# Patient Record
Sex: MALE | Race: WHITE | Employment: OTHER | URBAN - METROPOLITAN AREA
[De-identification: names, ages, dates, MRNs, and addresses within clinical notes are randomized per-mention and may not be internally consistent; named-entity substitution may affect disease eponyms.]

---

## 2021-08-14 ENCOUNTER — HOSPITAL ENCOUNTER (INPATIENT)
Age: 71
LOS: 8 days | Discharge: HOME | DRG: 177 | End: 2021-08-22
Attending: EMERGENCY MEDICINE | Admitting: HOSPITALIST
Payer: MEDICARE

## 2021-08-14 ENCOUNTER — APPOINTMENT (OUTPATIENT)
Dept: GENERAL RADIOLOGY | Age: 71
DRG: 177 | End: 2021-08-14
Attending: EMERGENCY MEDICINE
Payer: MEDICARE

## 2021-08-14 DIAGNOSIS — E11.65 UNCONTROLLED TYPE 2 DIABETES MELLITUS WITH HYPERGLYCEMIA (HCC): ICD-10-CM

## 2021-08-14 DIAGNOSIS — J12.82 PNEUMONIA DUE TO COVID-19 VIRUS: Primary | ICD-10-CM

## 2021-08-14 DIAGNOSIS — R09.02 HYPOXIA: ICD-10-CM

## 2021-08-14 DIAGNOSIS — U07.1 PNEUMONIA DUE TO COVID-19 VIRUS: Primary | ICD-10-CM

## 2021-08-14 LAB
ALBUMIN SERPL-MCNC: 2.5 G/DL (ref 3.5–5)
ALBUMIN/GLOB SERPL: 0.5 {RATIO} (ref 1.1–2.2)
ALP SERPL-CCNC: 87 U/L (ref 45–117)
ALT SERPL-CCNC: 26 U/L (ref 12–78)
ANION GAP SERPL CALC-SCNC: 7 MMOL/L (ref 5–15)
AST SERPL-CCNC: 45 U/L (ref 15–37)
ATRIAL RATE: 75 BPM
ATRIAL RATE: 80 BPM
BASOPHILS # BLD: 0 K/UL (ref 0–0.1)
BASOPHILS NFR BLD: 0 % (ref 0–1)
BILIRUB SERPL-MCNC: 0.8 MG/DL (ref 0.2–1)
BUN SERPL-MCNC: 32 MG/DL (ref 6–20)
BUN/CREAT SERPL: 24 (ref 12–20)
CALCIUM SERPL-MCNC: 9 MG/DL (ref 8.5–10.1)
CALCULATED P AXIS, ECG09: 46 DEGREES
CALCULATED P AXIS, ECG09: 57 DEGREES
CALCULATED R AXIS, ECG10: 19 DEGREES
CALCULATED R AXIS, ECG10: 31 DEGREES
CALCULATED T AXIS, ECG11: 38 DEGREES
CALCULATED T AXIS, ECG11: 47 DEGREES
CHLORIDE SERPL-SCNC: 104 MMOL/L (ref 97–108)
CO2 SERPL-SCNC: 26 MMOL/L (ref 21–32)
CREAT SERPL-MCNC: 1.31 MG/DL (ref 0.7–1.3)
CRP SERPL-MCNC: 16.1 MG/DL (ref 0–0.6)
D DIMER PPP FEU-MCNC: 1.41 MG/L FEU (ref 0–0.65)
DIAGNOSIS, 93000: NORMAL
DIAGNOSIS, 93000: NORMAL
DIFFERENTIAL METHOD BLD: ABNORMAL
EOSINOPHIL # BLD: 0 K/UL (ref 0–0.4)
EOSINOPHIL NFR BLD: 0 % (ref 0–7)
ERYTHROCYTE [DISTWIDTH] IN BLOOD BY AUTOMATED COUNT: 12.5 % (ref 11.5–14.5)
EST. AVERAGE GLUCOSE BLD GHB EST-MCNC: 260 MG/DL
GLOBULIN SER CALC-MCNC: 5.1 G/DL (ref 2–4)
GLUCOSE BLD STRIP.AUTO-MCNC: 160 MG/DL (ref 65–117)
GLUCOSE BLD STRIP.AUTO-MCNC: 213 MG/DL (ref 65–117)
GLUCOSE BLD STRIP.AUTO-MCNC: 306 MG/DL (ref 65–117)
GLUCOSE SERPL-MCNC: 182 MG/DL (ref 65–100)
HBA1C MFR BLD: 10.7 % (ref 4–5.6)
HCT VFR BLD AUTO: 48.7 % (ref 36.6–50.3)
HGB BLD-MCNC: 15.8 G/DL (ref 12.1–17)
IMM GRANULOCYTES # BLD AUTO: 0.1 K/UL (ref 0–0.04)
IMM GRANULOCYTES NFR BLD AUTO: 1 % (ref 0–0.5)
LACTATE SERPL-SCNC: 1.6 MMOL/L (ref 0.4–2)
LYMPHOCYTES # BLD: 1 K/UL (ref 0.8–3.5)
LYMPHOCYTES NFR BLD: 12 % (ref 12–49)
MAGNESIUM SERPL-MCNC: 2 MG/DL (ref 1.6–2.4)
MCH RBC QN AUTO: 30.2 PG (ref 26–34)
MCHC RBC AUTO-ENTMCNC: 32.4 G/DL (ref 30–36.5)
MCV RBC AUTO: 92.9 FL (ref 80–99)
MONOCYTES # BLD: 0.6 K/UL (ref 0–1)
MONOCYTES NFR BLD: 7 % (ref 5–13)
NEUTS SEG # BLD: 7.2 K/UL (ref 1.8–8)
NEUTS SEG NFR BLD: 80 % (ref 32–75)
NRBC # BLD: 0 K/UL (ref 0–0.01)
NRBC BLD-RTO: 0 PER 100 WBC
P-R INTERVAL, ECG05: 166 MS
P-R INTERVAL, ECG05: 172 MS
PLATELET # BLD AUTO: 255 K/UL (ref 150–400)
PMV BLD AUTO: 12 FL (ref 8.9–12.9)
POTASSIUM SERPL-SCNC: 4.3 MMOL/L (ref 3.5–5.1)
PROCALCITONIN SERPL-MCNC: 0.26 NG/ML
PROT SERPL-MCNC: 7.6 G/DL (ref 6.4–8.2)
Q-T INTERVAL, ECG07: 394 MS
Q-T INTERVAL, ECG07: 404 MS
QRS DURATION, ECG06: 104 MS
QRS DURATION, ECG06: 110 MS
QTC CALCULATION (BEZET), ECG08: 451 MS
QTC CALCULATION (BEZET), ECG08: 454 MS
RBC # BLD AUTO: 5.24 M/UL (ref 4.1–5.7)
SARS-COV-2, COV2: NORMAL
SERVICE CMNT-IMP: ABNORMAL
SODIUM SERPL-SCNC: 137 MMOL/L (ref 136–145)
VENTRICULAR RATE, ECG03: 75 BPM
VENTRICULAR RATE, ECG03: 80 BPM
WBC # BLD AUTO: 8.9 K/UL (ref 4.1–11.1)

## 2021-08-14 PROCEDURE — 74011636637 HC RX REV CODE- 636/637: Performed by: HOSPITALIST

## 2021-08-14 PROCEDURE — 85379 FIBRIN DEGRADATION QUANT: CPT

## 2021-08-14 PROCEDURE — 93005 ELECTROCARDIOGRAM TRACING: CPT

## 2021-08-14 PROCEDURE — 86140 C-REACTIVE PROTEIN: CPT

## 2021-08-14 PROCEDURE — 85025 COMPLETE CBC W/AUTO DIFF WBC: CPT

## 2021-08-14 PROCEDURE — 82962 GLUCOSE BLOOD TEST: CPT

## 2021-08-14 PROCEDURE — 36415 COLL VENOUS BLD VENIPUNCTURE: CPT

## 2021-08-14 PROCEDURE — 83735 ASSAY OF MAGNESIUM: CPT

## 2021-08-14 PROCEDURE — 83036 HEMOGLOBIN GLYCOSYLATED A1C: CPT

## 2021-08-14 PROCEDURE — 84145 PROCALCITONIN (PCT): CPT

## 2021-08-14 PROCEDURE — 74011250637 HC RX REV CODE- 250/637: Performed by: HOSPITALIST

## 2021-08-14 PROCEDURE — 83605 ASSAY OF LACTIC ACID: CPT

## 2021-08-14 PROCEDURE — 74011250636 HC RX REV CODE- 250/636: Performed by: HOSPITALIST

## 2021-08-14 PROCEDURE — U0005 INFEC AGEN DETEC AMPLI PROBE: HCPCS

## 2021-08-14 PROCEDURE — 80053 COMPREHEN METABOLIC PANEL: CPT

## 2021-08-14 PROCEDURE — 99285 EMERGENCY DEPT VISIT HI MDM: CPT

## 2021-08-14 PROCEDURE — 65660000000 HC RM CCU STEPDOWN

## 2021-08-14 PROCEDURE — 71045 X-RAY EXAM CHEST 1 VIEW: CPT

## 2021-08-14 RX ORDER — MELATONIN
2000 DAILY
Status: DISCONTINUED | OUTPATIENT
Start: 2021-08-14 | End: 2021-08-22 | Stop reason: HOSPADM

## 2021-08-14 RX ORDER — ZINC SULFATE 50(220)MG
1 CAPSULE ORAL DAILY
Status: DISCONTINUED | OUTPATIENT
Start: 2021-08-14 | End: 2021-08-22 | Stop reason: HOSPADM

## 2021-08-14 RX ORDER — GUAIFENESIN 100 MG/5ML
81 LIQUID (ML) ORAL DAILY
Status: DISCONTINUED | OUTPATIENT
Start: 2021-08-14 | End: 2021-08-22 | Stop reason: HOSPADM

## 2021-08-14 RX ORDER — ACETAMINOPHEN 650 MG/1
650 SUPPOSITORY RECTAL
Status: DISCONTINUED | OUTPATIENT
Start: 2021-08-14 | End: 2021-08-22 | Stop reason: HOSPADM

## 2021-08-14 RX ORDER — SODIUM CHLORIDE 0.9 % (FLUSH) 0.9 %
5-40 SYRINGE (ML) INJECTION EVERY 8 HOURS
Status: DISCONTINUED | OUTPATIENT
Start: 2021-08-14 | End: 2021-08-22 | Stop reason: HOSPADM

## 2021-08-14 RX ORDER — SODIUM CHLORIDE 0.9 % (FLUSH) 0.9 %
5-40 SYRINGE (ML) INJECTION AS NEEDED
Status: DISCONTINUED | OUTPATIENT
Start: 2021-08-14 | End: 2021-08-22 | Stop reason: HOSPADM

## 2021-08-14 RX ORDER — DEXAMETHASONE SODIUM PHOSPHATE 10 MG/ML
6 INJECTION INTRAMUSCULAR; INTRAVENOUS EVERY 24 HOURS
Status: DISCONTINUED | OUTPATIENT
Start: 2021-08-14 | End: 2021-08-22 | Stop reason: HOSPADM

## 2021-08-14 RX ORDER — ACETAMINOPHEN 325 MG/1
650 TABLET ORAL
Status: DISCONTINUED | OUTPATIENT
Start: 2021-08-14 | End: 2021-08-22 | Stop reason: HOSPADM

## 2021-08-14 RX ORDER — INSULIN LISPRO 100 [IU]/ML
INJECTION, SOLUTION INTRAVENOUS; SUBCUTANEOUS
Status: DISCONTINUED | OUTPATIENT
Start: 2021-08-14 | End: 2021-08-17

## 2021-08-14 RX ORDER — ASCORBIC ACID 500 MG
500 TABLET ORAL 2 TIMES DAILY
Status: DISCONTINUED | OUTPATIENT
Start: 2021-08-14 | End: 2021-08-22 | Stop reason: HOSPADM

## 2021-08-14 RX ORDER — MAGNESIUM SULFATE 100 %
4 CRYSTALS MISCELLANEOUS AS NEEDED
Status: DISCONTINUED | OUTPATIENT
Start: 2021-08-14 | End: 2021-08-22 | Stop reason: HOSPADM

## 2021-08-14 RX ORDER — ALBUTEROL SULFATE 90 UG/1
2 AEROSOL, METERED RESPIRATORY (INHALATION)
Status: DISCONTINUED | OUTPATIENT
Start: 2021-08-14 | End: 2021-08-15

## 2021-08-14 RX ORDER — LOSARTAN POTASSIUM 50 MG/1
50 TABLET ORAL DAILY
Status: DISCONTINUED | OUTPATIENT
Start: 2021-08-14 | End: 2021-08-22 | Stop reason: HOSPADM

## 2021-08-14 RX ORDER — ENOXAPARIN SODIUM 100 MG/ML
30 INJECTION SUBCUTANEOUS EVERY 12 HOURS
Status: DISCONTINUED | OUTPATIENT
Start: 2021-08-14 | End: 2021-08-22 | Stop reason: HOSPADM

## 2021-08-14 RX ORDER — ATORVASTATIN CALCIUM 20 MG/1
20 TABLET, FILM COATED ORAL DAILY
Status: DISCONTINUED | OUTPATIENT
Start: 2021-08-14 | End: 2021-08-22 | Stop reason: HOSPADM

## 2021-08-14 RX ORDER — DEXTROSE 50 % IN WATER (D50W) INTRAVENOUS SYRINGE
12.5-25 AS NEEDED
Status: DISCONTINUED | OUTPATIENT
Start: 2021-08-14 | End: 2021-08-22 | Stop reason: HOSPADM

## 2021-08-14 RX ADMIN — Medication 10 ML: at 15:05

## 2021-08-14 RX ADMIN — DEXAMETHASONE SODIUM PHOSPHATE 6 MG: 10 INJECTION INTRAMUSCULAR; INTRAVENOUS at 15:05

## 2021-08-14 RX ADMIN — ASPIRIN 81 MG: 81 TABLET, CHEWABLE ORAL at 15:05

## 2021-08-14 RX ADMIN — INSULIN LISPRO 4 UNITS: 100 INJECTION, SOLUTION INTRAVENOUS; SUBCUTANEOUS at 23:46

## 2021-08-14 RX ADMIN — INSULIN LISPRO 3 UNITS: 100 INJECTION, SOLUTION INTRAVENOUS; SUBCUTANEOUS at 18:14

## 2021-08-14 RX ADMIN — ZINC SULFATE 220 MG (50 MG) CAPSULE 1 CAPSULE: CAPSULE at 08:17

## 2021-08-14 RX ADMIN — OXYCODONE HYDROCHLORIDE AND ACETAMINOPHEN 500 MG: 500 TABLET ORAL at 08:17

## 2021-08-14 RX ADMIN — ATORVASTATIN CALCIUM 20 MG: 20 TABLET, FILM COATED ORAL at 15:05

## 2021-08-14 RX ADMIN — LOSARTAN POTASSIUM 50 MG: 50 TABLET, FILM COATED ORAL at 15:05

## 2021-08-14 RX ADMIN — OXYCODONE HYDROCHLORIDE AND ACETAMINOPHEN 500 MG: 500 TABLET ORAL at 18:14

## 2021-08-14 RX ADMIN — Medication 2000 UNITS: at 08:17

## 2021-08-14 RX ADMIN — ENOXAPARIN SODIUM 30 MG: 40 INJECTION SUBCUTANEOUS at 18:14

## 2021-08-14 RX ADMIN — Medication 10 ML: at 23:46

## 2021-08-14 NOTE — PROGRESS NOTES
TRANSFER - IN REPORT:    Verbal report received from [address] [place] [address] (name) on Bella Maldonado  being received from ED (unit) for routine progression of care      Report consisted of patients Situation, Background, Assessment and   Recommendations(SBAR). Information from the following report(s) SBAR, Kardex, [facility] and Recent Results was reviewed with the receiving nurse. Opportunity for questions and clarification was provided. Assessment completed upon patients arrival to unit and care assumed.

## 2021-08-14 NOTE — ED TRIAGE NOTES
Pt arrives with CC of low oxygen due to COVID 19. Pt oxygen saturation is 86% on RA. He was diagnosed Thursday with COVID. He denies COVID vaccine.

## 2021-08-14 NOTE — ED NOTES
TRANSFER - OUT REPORT:    Verbal report given to CONRADO Spence on [employer]  being transferred to  for routine progression of care       Report consisted of patients Situation, Background, Assessment and   Recommendations(SBAR). Information from the following report(s) ED Summary was reviewed with the receiving nurse. Lines:   Peripheral IV 08/14/21 Right Antecubital (Active)   Site Assessment Clean, dry, & intact 08/14/21 0623   Phlebitis Assessment 0 08/14/21 0623   Infiltration Assessment 0 08/14/21 0623   Dressing Status Clean, dry, & intact 08/14/21 0623   Hub Color/Line Status Pink 08/14/21 0623   Action Taken Blood drawn 08/14/21 2220        Opportunity for questions and clarification was provided.       Patient transported with:   O2 @ 4 liters

## 2021-08-14 NOTE — H&P
[address] [place] [address]  HISTORY AND PHYSICAL    Name:  Viki Pruitt  MR#:  [id number]  :  1950  ACCOUNT #:  [de-identified]  ADMIT DATE:  2021      PRIMARY CARE PROVIDER:   Shamar Cooper MD    SOURCE OF INFORMATION:  Patient. CHIEF COMPLAINT:  Low oxygen and fever, last 1-2 days. HISTORY OF PRESENTING ILLNESS:  This is a 60-year-old  male with past medical history significant for type 2 diabetes mellitus, hypertension, and hypercholesterolemia, who presented to [facility] emergency department with low oxygen 84% at home and fever, 1-2 days. He stated that he was diagnosed with COVID-19 a week ago after he had had fever and dry cough. For the last 2 days he has become weak, spike fever and his oxygen level at home was 84%, and decided to come to [facility] emergency department. No left-sided chest pain, palpitation, diaphoresis, nausea, vomiting, abdominal pain, urinary complaint, abnormal bowel movement, or lower extremities swelling. He has short of breath worse with exertion. He has not vaccinated for COVID-19. When he was seen in ER, his vital signs; blood pressure 112/66, pulse 85, temperature 98, saturation of oxygen 86% on room air, and oxygen improved when he was placed on 4 liters per minute. Chest x-ray was done showed bibasilar patchy infiltrate, and hospitalist service is consulted for further evaluation and admission. REVIEW OF SYSTEMS:  Pertinent positive findings mentioned in HPI. All systems reviewed, no any other positive findings. PAST MEDICAL HISTORY:  1. Type 2 diabetes mellitus. 2.  Hypertension. 3.  Hypercholesterolemia. PRIOR TO ADMISSION MEDICATIONS:  Include:  1. Metformin 500 mg p.o. b.i.d.  2.  Glimepiride 4 mg p.o. daily. 3.  Statin. 4.  Aspirin 81 mg p.o. daily. 5.  Losartan 50 mg p.o. daily. ALLERGIES:  SHELLFISH     SOCIAL HISTORY:  He lives with his wife. No tobacco or alcohol abuse. Ambulate independently.   Code status, full code. FAMILY HISTORY:  Father:  History of diabetes and heart disease. Mother:  History of cancer but he did not remember. PHYSICAL EXAMINATION:  VITAL SIGNS:  Blood pressure 132/73, pulse 74, temperature 98, saturation of oxygen 92% on 4 liters per minute nasal cannula. GENERAL APPEARANCE:  The patient is alert, cooperative, not in distress. He appears his stated age. HEENT:  Pink conjunctivae. Anicteric sclerae. Moist tongue and buccal mucosa. LUNGS:  Decreased bronchial breath sounds to auscultation bilaterally. CHEST WALL:  No tenderness or deformity. CARDIOVASCULAR SYSTEM:  Regular rate and rhythm. S1 and S2 normal.  No murmur or gallop. ABDOMEN:  Soft, nontender. Bowel sounds normal.  No mass or organomegaly. EXTREMITIES:  No cyanosis or edema. SKIN:  No rash or lesion. CENTRAL NERVOUS SYSTEM:  Conscious, well oriented to time, place and person. Motor 5/5. Sensation intact. Cranial nerves II-XII grossly intact. DIAGNOSTIC DATA:  Normal sinus rhythm, ventricular rate 80 beats per minute, nonspecific ST-T wave. LABORATORY DATA:  Hematology:  White blood cell count 8.9, hemoglobin 15.8, hematocrit 48.7, MCV 92.9, platelet count 687. D-dimer 1.41. Chemistry:  Sodium 137, potassium 4.3, chloride 104, CO2 of 26, anion gap 7, glucose 182, BUN 32, creatinine 1.31, BUN-creatinine ratio 24, calcium 9, magnesium 2. Total bilirubin 0.8, total protein 7.6, albumin 5.1, ALT 26, AST 48, alkaline phosphatase 87. Lactic acid 1.6. Procalcitonin 0.26.  C-reactive protein 16.10. Chest x-ray, bibasilar the patchy infiltrates. ASSESSMENT:  1. Acute hypoxic respiratory failure due to COVID pneumonia. 2.  Type 2 diabetes mellitus with hyperglycemia. 3.  Hypertension. 4.  Acute renal insufficiency. 5.  History of hypercholesterolemia. PLAN:  1. Acute hypoxic respiratory failure secondary to COVID-19 pneumonia:  Admit the patient to telemetry floor.   The patient tested for COVID a week ago. will do PCR, droplet plus precaution. start  Decadron 6 mg IV q.24 hours, vitamin C 500 mg p.o. b.i.d., zinc 220 mg p.o. daily, vitamin D3 2000 units p.o. daily, and p.r.n. albuterol inhaler, check inflammatory marker,Lovenox 30 mg subcutaneous b.i.d. The patient is not a candidate for remdesivir because of renal insufficiency and duration of illness. Monitor pulse ox  2. Type 2 diabetes mellitus with hyperglycemia:   check A1c. Hold home glimepiride and metformin. will do insulin sliding scale, monitor fingerstick glucose. 3.  History of hypertension:  Blood pressure normal.  Continue home medication losartan. 4.  History of hypercholesteremia:  Continue home medication statin. DVT prophylaxis:  Lovenox. FUNCTIONAL STATUS PRIOR TO ADMISSION:  The patient ambulates independently.         Cierra Pang MD      [id number]  D:  08/14/2021 8:03  T:  08/14/2021 10:24  JOB #:  [id number]

## 2021-08-14 NOTE — ACP (ADVANCE CARE PLANNING)
Advance Care Planning (ACP) Provider Note - Comprehensive     Date of ACP Conversation: 08/14/21     Persons included in Conversation: Patient and Dr Maci Belle    Patient Active Problem List   Diagnosis Code    Pneumonia due to COVID-19 virus U07.1, J12.82     These active diagnoses are of sufficient risk that focused discussion on advance care planning is indicated in order to allow the patient to thoughtfully consider him personal goals of care and, if situations arise that prevent the ability to personally give input, to ensure appropriate representation of her; personal desires through documentation or informed surrogate decision makers. Authorized Decision Maker (if patient is incapable of making informed decisions): This person is:  Wife, Christen Craigcharlotte    Discussions : I reviewed his acute medical condition including; Acute hypoxic respiratory failure due to COVID 19 pneumonia, T2DM with hyperglycemia, HTN and discussed his desire for ongoing aggressive care, including potential intubation and mechanical ventilation; also discussed who would speak on his behalf should he be unable to do so and discussed what conversation he has had with his family so they understand his desire if such a situation occurred now or in the future . He said he wants aggressive care during this admission and wants a Full Code. He said his wife Christen Yang is his medical decision maker.              Length of ACP Conversation in minutes:  16 minutes         Trevin Fuentes MD  8/14/2021

## 2021-08-14 NOTE — ED PROVIDER NOTES
History of hypertension, diabetes. He presents with complaints of being Covid positive with O2 saturations of 84% at home. He states that he was diagnosed with Covid 1 week ago. He began to feel ill 1 to 2 days prior to being diagnosed. He has had fevers. He has had a mild cough. He feels weak and fatigued. His appetite has been decreased. He has been able to drink fluids. He has not had the Covid vaccine. No past medical history on file. No past surgical history on file. No family history on file. Social History     Socioeconomic History    Marital status:      Spouse name: Not on file    Number of children: Not on file    Years of education: Not on file    Highest education level: Not on file   Occupational History    Not on file   Tobacco Use    Smoking status: Not on file   Substance and Sexual Activity    Alcohol use: Not on file    Drug use: Not on file    Sexual activity: Not on file   Other Topics Concern    Not on file   Social History Narrative    Not on file     Social Determinants of Health     Financial Resource Strain:     Difficulty of Paying Living Expenses:    Food Insecurity:     Worried About Running Out of Food in the Last Year:     [address] in the Last Year:    Transportation Needs:     Lack of Transportation (Medical):  Lack of Transportation (Non-Medical):    Physical Activity:     Days of Exercise per Week:     Minutes of Exercise per Session:    Stress:     Feeling of Stress :    Social Connections:     Frequency of Communication with Friends and Family:     Frequency of Social Gatherings with Friends and Family:     Attends Islam Services:     Active Member of Clubs or Organizations:     Attends Club or Organization Meetings:     Marital Status:    Intimate Partner Violence:     Fear of Current or Ex-Partner:     Emotionally Abused:     Physically Abused:     Sexually Abused:           ALLERGIES: Shellfish derived    Review of Systems   All other systems reviewed and are negative. Vitals:    08/14/21 0530 08/14/21 0540 08/14/21 0600   BP: 112/66  137/76   Pulse: 85  87   Resp: 18  24   Temp: 98 °F (36.7 °C)     SpO2: (!) 86% 92% 91%            Physical Exam  Vitals and nursing note reviewed. Constitutional:       Appearance: He is well-developed. Comments: Moderately ill-appearing. HENT:      Head: Normocephalic and atraumatic. Eyes:      Conjunctiva/sclera: Conjunctivae normal.   Neck:      Trachea: No tracheal deviation. Cardiovascular:      Rate and Rhythm: Normal rate and regular rhythm. Heart sounds: Normal heart sounds. No murmur heard. No friction rub. No gallop. Pulmonary:      Effort: Pulmonary effort is normal.      Comments: Decreased breath sounds  Abdominal:      Palpations: Abdomen is soft. Tenderness: There is no abdominal tenderness. Musculoskeletal:         General: No deformity. Cervical back: Neck supple. Skin:     General: Skin is warm and dry. Neurological:      Mental Status: He is alert. Comments: oriented          MDM       Procedures    Perfect Serve Consult for Admission  6:44 AM    ED Room Number: ER10/10  [facility] [address] 70 [facility]  male  Working Diagnosis: Pneumonia/Covid/hypoxia  COVID-19 Suspicion:  yes  Sepsis present:  no  Reassessment needed: no  Code Status:  Full Code  Readmission: no  Isolation Requirements:  yes  Recommended Level of Care:  telemetry  Department:[facility]   Other: Covid positive. Day 8 or 9. Pulse ox 84% at home. 86% here on arrival.  On 4 L with sats in the low 90s. He does not look in any respiratory distress. Chest x-ray shows bilateral infiltrates. EKG: Normal sinus rhythm; rate of 80; anterior Q waves; normal ST, Mervat Doss MD  6:56 AM    Consult note: Dr. Rizwan Jaquez -will arrange admission.   Susannah Gunn MD  6:56 AM    Total critical care time spent exclusive of procedures: 35 minutes.   Larry Fox MD  6:58 AM

## 2021-08-15 ENCOUNTER — APPOINTMENT (OUTPATIENT)
Dept: CT IMAGING | Age: 71
DRG: 177 | End: 2021-08-15
Attending: INTERNAL MEDICINE
Payer: MEDICARE

## 2021-08-15 LAB
ALBUMIN SERPL-MCNC: 2.2 G/DL (ref 3.5–5)
ALBUMIN/GLOB SERPL: 0.4 {RATIO} (ref 1.1–2.2)
ALP SERPL-CCNC: 89 U/L (ref 45–117)
ALT SERPL-CCNC: 29 U/L (ref 12–78)
ANION GAP SERPL CALC-SCNC: 6 MMOL/L (ref 5–15)
ANION GAP SERPL CALC-SCNC: 9 MMOL/L (ref 5–15)
AST SERPL-CCNC: 35 U/L (ref 15–37)
BASOPHILS # BLD: 0 K/UL (ref 0–0.1)
BASOPHILS NFR BLD: 0 % (ref 0–1)
BILIRUB SERPL-MCNC: 0.6 MG/DL (ref 0.2–1)
BUN SERPL-MCNC: 33 MG/DL (ref 6–20)
BUN SERPL-MCNC: 33 MG/DL (ref 6–20)
BUN/CREAT SERPL: 31 (ref 12–20)
BUN/CREAT SERPL: 34 (ref 12–20)
CALCIUM SERPL-MCNC: 9 MG/DL (ref 8.5–10.1)
CALCIUM SERPL-MCNC: 9.3 MG/DL (ref 8.5–10.1)
CHLORIDE SERPL-SCNC: 106 MMOL/L (ref 97–108)
CHLORIDE SERPL-SCNC: 107 MMOL/L (ref 97–108)
CO2 SERPL-SCNC: 24 MMOL/L (ref 21–32)
CO2 SERPL-SCNC: 26 MMOL/L (ref 21–32)
COVID-19 RAPID TEST, COVR: DETECTED
CREAT SERPL-MCNC: 0.96 MG/DL (ref 0.7–1.3)
CREAT SERPL-MCNC: 1.08 MG/DL (ref 0.7–1.3)
CRP SERPL-MCNC: 12 MG/DL (ref 0–0.6)
D DIMER PPP FEU-MCNC: 1.42 MG/L FEU (ref 0–0.65)
DIFFERENTIAL METHOD BLD: ABNORMAL
EOSINOPHIL # BLD: 0 K/UL (ref 0–0.4)
EOSINOPHIL NFR BLD: 0 % (ref 0–7)
ERYTHROCYTE [DISTWIDTH] IN BLOOD BY AUTOMATED COUNT: 12.1 % (ref 11.5–14.5)
FERRITIN SERPL-MCNC: 1723 NG/ML (ref 26–388)
FIBRINOGEN PPP-MCNC: >800 MG/DL (ref 200–475)
GLOBULIN SER CALC-MCNC: 5.2 G/DL (ref 2–4)
GLUCOSE BLD STRIP.AUTO-MCNC: 266 MG/DL (ref 65–117)
GLUCOSE BLD STRIP.AUTO-MCNC: 271 MG/DL (ref 65–117)
GLUCOSE BLD STRIP.AUTO-MCNC: 330 MG/DL (ref 65–117)
GLUCOSE BLD STRIP.AUTO-MCNC: 442 MG/DL (ref 65–117)
GLUCOSE SERPL-MCNC: 308 MG/DL (ref 65–100)
GLUCOSE SERPL-MCNC: 312 MG/DL (ref 65–100)
HCT VFR BLD AUTO: 49.4 % (ref 36.6–50.3)
HGB BLD-MCNC: 15.8 G/DL (ref 12.1–17)
IMM GRANULOCYTES # BLD AUTO: 0 K/UL
IMM GRANULOCYTES NFR BLD AUTO: 0 %
INR PPP: 1.1 (ref 0.9–1.1)
LYMPHOCYTES # BLD: 0.7 K/UL (ref 0.8–3.5)
LYMPHOCYTES NFR BLD: 10 % (ref 12–49)
MCH RBC QN AUTO: 29.8 PG (ref 26–34)
MCHC RBC AUTO-ENTMCNC: 32 G/DL (ref 30–36.5)
MCV RBC AUTO: 93 FL (ref 80–99)
MONOCYTES # BLD: 0.5 K/UL (ref 0–1)
MONOCYTES NFR BLD: 8 % (ref 5–13)
NEUTS SEG # BLD: 5.3 K/UL (ref 1.8–8)
NEUTS SEG NFR BLD: 82 % (ref 32–75)
NRBC # BLD: 0 K/UL (ref 0–0.01)
NRBC BLD-RTO: 0 PER 100 WBC
PLATELET # BLD AUTO: 283 K/UL (ref 150–400)
PMV BLD AUTO: 12.1 FL (ref 8.9–12.9)
POTASSIUM SERPL-SCNC: 4.4 MMOL/L (ref 3.5–5.1)
POTASSIUM SERPL-SCNC: 4.8 MMOL/L (ref 3.5–5.1)
PROT SERPL-MCNC: 7.4 G/DL (ref 6.4–8.2)
PROTHROMBIN TIME: 11 SEC (ref 9–11.1)
RBC # BLD AUTO: 5.31 M/UL (ref 4.1–5.7)
RBC MORPH BLD: ABNORMAL
SARS-COV-2, XPLCVT: DETECTED
SERVICE CMNT-IMP: ABNORMAL
SODIUM SERPL-SCNC: 137 MMOL/L (ref 136–145)
SODIUM SERPL-SCNC: 141 MMOL/L (ref 136–145)
SOURCE, COVRS: ABNORMAL
SOURCE, COVRS: ABNORMAL
TROPONIN I SERPL-MCNC: <0.05 NG/ML
WBC # BLD AUTO: 6.5 K/UL (ref 4.1–11.1)

## 2021-08-15 PROCEDURE — 86140 C-REACTIVE PROTEIN: CPT

## 2021-08-15 PROCEDURE — 84484 ASSAY OF TROPONIN QUANT: CPT

## 2021-08-15 PROCEDURE — 94640 AIRWAY INHALATION TREATMENT: CPT

## 2021-08-15 PROCEDURE — 85384 FIBRINOGEN ACTIVITY: CPT

## 2021-08-15 PROCEDURE — 82962 GLUCOSE BLOOD TEST: CPT

## 2021-08-15 PROCEDURE — 74011250636 HC RX REV CODE- 250/636: Performed by: HOSPITALIST

## 2021-08-15 PROCEDURE — 65660000000 HC RM CCU STEPDOWN

## 2021-08-15 PROCEDURE — 77010033711 HC HIGH FLOW OXYGEN

## 2021-08-15 PROCEDURE — 74011000636 HC RX REV CODE- 636: Performed by: RADIOLOGY

## 2021-08-15 PROCEDURE — 74011636637 HC RX REV CODE- 636/637: Performed by: HOSPITALIST

## 2021-08-15 PROCEDURE — 85610 PROTHROMBIN TIME: CPT

## 2021-08-15 PROCEDURE — 74011250637 HC RX REV CODE- 250/637: Performed by: HOSPITALIST

## 2021-08-15 PROCEDURE — 74011636637 HC RX REV CODE- 636/637: Performed by: NURSE PRACTITIONER

## 2021-08-15 PROCEDURE — 85025 COMPLETE CBC W/AUTO DIFF WBC: CPT

## 2021-08-15 PROCEDURE — 87635 SARS-COV-2 COVID-19 AMP PRB: CPT

## 2021-08-15 PROCEDURE — 74011000258 HC RX REV CODE- 258: Performed by: NURSE PRACTITIONER

## 2021-08-15 PROCEDURE — 74011636637 HC RX REV CODE- 636/637: Performed by: INTERNAL MEDICINE

## 2021-08-15 PROCEDURE — 80053 COMPREHEN METABOLIC PANEL: CPT

## 2021-08-15 PROCEDURE — 74011250637 HC RX REV CODE- 250/637: Performed by: NURSE PRACTITIONER

## 2021-08-15 PROCEDURE — 71275 CT ANGIOGRAPHY CHEST: CPT

## 2021-08-15 PROCEDURE — 85379 FIBRIN DEGRADATION QUANT: CPT

## 2021-08-15 PROCEDURE — 36415 COLL VENOUS BLD VENIPUNCTURE: CPT

## 2021-08-15 PROCEDURE — XW033H5 INTRODUCTION OF TOCILIZUMAB INTO PERIPHERAL VEIN, PERCUTANEOUS APPROACH, NEW TECHNOLOGY GROUP 5: ICD-10-PCS | Performed by: INTERNAL MEDICINE

## 2021-08-15 PROCEDURE — 77010033678 HC OXYGEN DAILY

## 2021-08-15 PROCEDURE — 82728 ASSAY OF FERRITIN: CPT

## 2021-08-15 RX ORDER — FUROSEMIDE 10 MG/ML
40 INJECTION INTRAMUSCULAR; INTRAVENOUS ONCE
Status: ACTIVE | OUTPATIENT
Start: 2021-08-15 | End: 2021-08-15

## 2021-08-15 RX ORDER — ALBUTEROL SULFATE 90 UG/1
2 AEROSOL, METERED RESPIRATORY (INHALATION)
Status: DISCONTINUED | OUTPATIENT
Start: 2021-08-15 | End: 2021-08-17

## 2021-08-15 RX ORDER — INSULIN GLARGINE 100 [IU]/ML
20 INJECTION, SOLUTION SUBCUTANEOUS DAILY
Status: DISCONTINUED | OUTPATIENT
Start: 2021-08-15 | End: 2021-08-16

## 2021-08-15 RX ADMIN — Medication 10 ML: at 13:17

## 2021-08-15 RX ADMIN — INSULIN LISPRO 6 UNITS: 100 INJECTION, SOLUTION INTRAVENOUS; SUBCUTANEOUS at 23:46

## 2021-08-15 RX ADMIN — ENOXAPARIN SODIUM 30 MG: 40 INJECTION SUBCUTANEOUS at 18:18

## 2021-08-15 RX ADMIN — LOSARTAN POTASSIUM 50 MG: 50 TABLET, FILM COATED ORAL at 10:49

## 2021-08-15 RX ADMIN — IOPAMIDOL 100 ML: 755 INJECTION, SOLUTION INTRAVENOUS at 18:00

## 2021-08-15 RX ADMIN — Medication 10 ML: at 23:11

## 2021-08-15 RX ADMIN — INSULIN GLARGINE 20 UNITS: 100 INJECTION, SOLUTION SUBCUTANEOUS at 08:39

## 2021-08-15 RX ADMIN — OXYCODONE HYDROCHLORIDE AND ACETAMINOPHEN 500 MG: 500 TABLET ORAL at 18:19

## 2021-08-15 RX ADMIN — ZINC SULFATE 220 MG (50 MG) CAPSULE 1 CAPSULE: CAPSULE at 10:48

## 2021-08-15 RX ADMIN — INSULIN LISPRO 5 UNITS: 100 INJECTION, SOLUTION INTRAVENOUS; SUBCUTANEOUS at 08:40

## 2021-08-15 RX ADMIN — Medication 2000 UNITS: at 10:49

## 2021-08-15 RX ADMIN — ATORVASTATIN CALCIUM 20 MG: 20 TABLET, FILM COATED ORAL at 10:49

## 2021-08-15 RX ADMIN — ENOXAPARIN SODIUM 30 MG: 40 INJECTION SUBCUTANEOUS at 07:57

## 2021-08-15 RX ADMIN — Medication 10 ML: at 07:57

## 2021-08-15 RX ADMIN — ALBUTEROL SULFATE 2 PUFF: 90 AEROSOL, METERED RESPIRATORY (INHALATION) at 14:28

## 2021-08-15 RX ADMIN — DEXAMETHASONE SODIUM PHOSPHATE 6 MG: 10 INJECTION INTRAMUSCULAR; INTRAVENOUS at 13:25

## 2021-08-15 RX ADMIN — INSULIN LISPRO 7 UNITS: 100 INJECTION, SOLUTION INTRAVENOUS; SUBCUTANEOUS at 18:19

## 2021-08-15 RX ADMIN — TOCILIZUMAB 810 MG: 180 INJECTION, SOLUTION SUBCUTANEOUS at 13:11

## 2021-08-15 RX ADMIN — INSULIN LISPRO 5 UNITS: 100 INJECTION, SOLUTION INTRAVENOUS; SUBCUTANEOUS at 13:25

## 2021-08-15 RX ADMIN — OXYCODONE HYDROCHLORIDE AND ACETAMINOPHEN 500 MG: 500 TABLET ORAL at 10:49

## 2021-08-15 RX ADMIN — ALBUTEROL SULFATE 2 PUFF: 90 AEROSOL, METERED RESPIRATORY (INHALATION) at 20:15

## 2021-08-15 RX ADMIN — ASPIRIN 81 MG: 81 TABLET, CHEWABLE ORAL at 10:49

## 2021-08-15 NOTE — PROGRESS NOTES
[facility]  Hospitalist Group                                                                                          Hospitalist Progress Note  Marianne Tripp MD  Answering service: [phone] from in house phone        Date of Service:  8/15/2021  NAME:  Hector Yao  :  1950  MRN:  [id number]      Admission Summary: This is a 51-year-old  male with past medical history significant for type 2 diabetes mellitus, hypertension, and hypercholesterolemia, who presented to [facility] emergency department with low oxygen 84% at home and fever, 1-2 days. Interval history / Subjective: Thinks his breathing is improved actually since admission, however oxygen requirements have increased dramatically. Now maxxed out on mid flow. Long discussion with patient regarding intubation and code status. He would like to be DNR, DDNR signed. Assessment & Plan:     COVID 19  Acute hypoxic respiratory failure  - O2 to maintain saturations over 90%, currently maxed out on mid flow. - Start high flow nasal cannula and titrate up   - Continue dexamethasone, VitC, Zinc, VitD  - Out of the window for Remdesivir per hospital policy   - Encouraged prone positioning, incentive spirometry   - Procalcitonin is 0.26 hold antibiotics for now   - Monitor inflammatory markers   - AC prophylaxis per protocol   - Pulmonary consult for Actemra     Type 2 diabetes - A1c 10.7%  - SSI, POCT glucose checks and hypoglycemia protocol   - Start basal insulin, 20U, and uptitrate     HTN - Continue losartan and monitor BP   HLD - statin     Code status: DNR, DDNR signed   DVT prophylaxis: lovenox     CRITICAL CARE ATTESTATION:  I had a face to face encounter with the patient, reviewed and interpreted patient data including clinical events, labs, images, vital signs, I/O's, and examined patient.   I have discussed the case and the plan and management of the patient's care with the consulting services, the bedside nurses and necessary ancillary providers. NOTE OF PERSONAL INVOLVEMENT IN CARE   This patient has a high probability of imminent, clinically significant deterioration, which requires the highest level of preparedness to intervene urgently. I participated in the decision-making and personally managed or directed the management of the following life and organ supporting interventions that required my frequent assessment to treat or prevent imminent deterioration. I personally spent 45 minutes of critical care time. This is time spent at this critically ill patient's bedside actively involved in patient care as well as the coordination of care and discussions with the patient's family. This does not include any procedural time which has been billed separately. Care Plan discussed with: Patient/Family  Anticipated Disposition: Home w/Family  Anticipated Discharge: Greater than 48 hours     Hospital Problems  Never Reviewed        Codes Class Noted POA    Pneumonia due to COVID-19 virus ICD-10-CM: U07.1, J12.82  ICD-9-CM: 480.8, 079.89  8/14/2021 Unknown                Review of Systems:   A comprehensive review of systems was negative except for that written in the HPI. Vital Signs:    Last 24hrs VS reviewed since prior progress note. Most recent are:  Visit Vitals  BP (!) 159/79 (BP 1 Location: Right arm, BP Patient Position: At rest)   Pulse 65   Temp 97.2 °F (36.2 °C)   Resp 16   Ht 6' (1.829 m)   Wt 94.8 kg (209 lb)   SpO2 91%   BMI 28.35 kg/m²       No intake or output data in the 24 hours ending 08/15/21 1334     Physical Examination:     I had a face to face encounter with this patient and independently examined them on 8/15/2021 as outlined below:          Constitutional:  Mild resp distress, cooperative, pleasant    ENT:  Oral mucosa moist, oropharynx benign. Resp:  Crackles bilaterally. No wheezing/rhonchi/rales. Tachypnea.  Hypoxic with low 90's on 15L CV:  Regular rhythm, normal rate, no murmurs, gallops, rubs    GI:  Soft, non distended, non tender. normoactive bowel sounds, no hepatosplenomegaly     Musculoskeletal:  No edema, warm, 2+ pulses throughout    Neurologic:  Moves all extremities. AAOx3, CN II-XII reviewed            Data Review:    Review and/or order of clinical lab test  Review and/or order of tests in the radiology section of CPT  Review and/or order of tests in the medicine section of [facility]      Labs:     Recent Labs     08/15/21  0743 08/14/21  0558   WBC 6.5 8.9   HGB 15.8 15.8   HCT 49.4 48.7    255     Recent Labs     08/15/21  0743 08/14/21  0558     137 137   K 4.8  4.4 4.3     107 104   CO2 26  24 26   BUN 33*  33* 32*   CREA 0.96  1.08 1.31*   *  312* 182*   CA 9.0  9.3 9.0   MG  --  2.0     Recent Labs     08/15/21  0743 08/14/21  0558   ALT 29 26   AP 89 87   TBILI 0.6 0.8   TP 7.4 7.6   ALB 2.2* 2.5*   GLOB 5.2* 5.1*     Recent Labs     08/15/21  0743   INR 1.1   PTP 11.0      Recent Labs     08/15/21  0743   FERR 1,723*      No results found for: FOL, RBCF   No results for input(s): PH, PCO2, PO2 in the last 72 hours.   Recent Labs     08/15/21  0743   TROIQ <0.05     No results found for: CHOL, CHOLX, CHLST, CHOLV, HDL, HDLP, LDL, LDLC, DLDLP, TGLX, TRIGL, TRIGP, CHHD, CHHDX  Lab Results   Component Value Date/Time    Glucose (POC) 271 (H) 08/15/2021 01:10 PM    Glucose (POC) 266 (H) 08/15/2021 07:56 AM    Glucose (POC) 306 (H) 08/14/2021 09:37 PM    Glucose (POC) 213 (H) 08/14/2021 05:11 PM    Glucose (POC) 160 (H) 08/14/2021 12:49 PM     No results found for: COLOR, APPRN, SPGRU, REFSG, KYLER, PROTU, GLUCU, KETU, BILU, UROU, AQUILES, LEUKU, GLUKE, EPSU, BACTU, WBCU, RBCU, CASTS, UCRY      Medications Reviewed:     Current Facility-Administered Medications   Medication Dose Route Frequency    insulin glargine (LANTUS) injection 20 Units  20 Units SubCUTAneous DAILY    albuterol (PROVENTIL HFA, VENTOLIN HFA, PROAIR HFA) inhaler 2 Puff  2 Puff Inhalation Q4H RT    [Held by provider] furosemide (LASIX) injection 40 mg  40 mg IntraVENous ONCE    tocilizumab (ACTEMRA ACTPEN) 810 mg in 0.9% sodium chloride 100 mL, overfill volume 10 mL infusion  810 mg IntraVENous ONCE    enoxaparin (LOVENOX) injection 30 mg  30 mg SubCUTAneous Q12H    acetaminophen (TYLENOL) tablet 650 mg  650 mg Oral Q6H PRN    Or    acetaminophen (TYLENOL) suppository 650 mg  650 mg Rectal Q6H PRN    dexamethasone (PF) (DECADRON) 10 mg/mL injection 6 mg  6 mg IntraVENous Q24H    cholecalciferol (VITAMIN D3) (1000 Units /25 mcg) tablet 2,000 Units  2,000 Units Oral DAILY    insulin lispro (HUMALOG) injection   SubCUTAneous AC&HS    glucose chewable tablet 16 g  4 Tablet Oral PRN    dextrose (D50W) injection syrg 12.5-25 g  12.5-25 g IntraVENous PRN    glucagon (GLUCAGEN) injection 1 mg  1 mg IntraMUSCular PRN    ascorbic acid (vitamin C) (VITAMIN C) tablet 500 mg  500 mg Oral BID    zinc sulfate (ZINCATE) 50 mg zinc (220 mg) capsule 1 Capsule  1 Capsule Oral DAILY    sodium chloride (NS) flush 5-40 mL  5-40 mL IntraVENous Q8H    sodium chloride (NS) flush 5-40 mL  5-40 mL IntraVENous PRN    aspirin chewable tablet 81 mg  81 mg Oral DAILY    losartan (COZAAR) tablet 50 mg  50 mg Oral DAILY    atorvastatin (LIPITOR) tablet 20 mg  20 mg Oral DAILY     ______________________________________________________________________  EXPECTED LENGTH OF STAY: - - -  ACTUAL LENGTH OF STAY:          1                 Walt Wyatt MD

## 2021-08-15 NOTE — PROGRESS NOTES
Bedside shift change report given to [address] (oncoming nurse) by [facility] RN  (offgoing nurse). Report included the following information SBAR, Kardex, MAR and Recent Results.

## 2021-08-15 NOTE — PROGRESS NOTES
Patient to be transferred to higher level of care this evening for high flow O2. Problem: Risk for Spread of Infection  Goal: Prevent transmission of infectious organism to others  Description: Prevent the transmission of infectious organisms to other patients, staff members, and visitors. Outcome: Progressing Towards Goal     Problem: Patient Education:  Go to Education Activity  Goal: Patient/Family Education  Outcome: Progressing Towards Goal     Problem: Diabetes Self-Management  Goal: *Disease process and treatment process  Description: Define diabetes and identify own type of diabetes; list 3 options for treating diabetes. Outcome: Progressing Towards Goal  Goal: *Incorporating nutritional management into lifestyle  Description: Describe effect of type, amount and timing of food on blood glucose; list 3 methods for planning meals. Outcome: Progressing Towards Goal  Goal: *Incorporating physical activity into lifestyle  Description: State effect of exercise on blood glucose levels. Outcome: Progressing Towards Goal  Goal: *Developing strategies to promote health/change behavior  Description: Define the ABC's of diabetes; identify appropriate screenings, schedule and personal plan for screenings. Outcome: Progressing Towards Goal  Goal: *Using medications safely  Description: State effect of diabetes medications on diabetes; name diabetes medication taking, action and side effects. Outcome: Progressing Towards Goal  Goal: *Monitoring blood glucose, interpreting and using results  Description: Identify recommended blood glucose targets  and personal targets. Outcome: Progressing Towards Goal  Goal: *Prevention, detection, treatment of acute complications  Description: List symptoms of hyper- and hypoglycemia; describe how to treat low blood sugar and actions for lowering  high blood glucose level.   Outcome: Progressing Towards Goal  Goal: *Prevention, detection and treatment of chronic complications  Description: Define the natural course of diabetes and describe the relationship of blood glucose levels to long term complications of diabetes. Outcome: Progressing Towards Goal  Goal: *Developing strategies to address psychosocial issues  Description: Describe feelings about living with diabetes; identify support needed and support network  Outcome: Progressing Towards Goal  Goal: *Insulin pump training  Outcome: Progressing Towards Goal  Goal: *Sick day guidelines  Outcome: Progressing Towards Goal  Goal: *Patient Specific Goal (EDIT GOAL, INSERT TEXT)  Outcome: Progressing Towards Goal     Problem: Patient Education: Go to Patient Education Activity  Goal: Patient/Family Education  Outcome: Progressing Towards Goal     Problem: Pressure Injury - Risk of  Goal: *Prevention of pressure injury  Description: Document Malik Scale and appropriate interventions in the flowsheet. Outcome: Progressing Towards Goal  Note: Pressure Injury Interventions:             Activity Interventions: Increase time out of bed, Pressure redistribution bed/mattress(bed type)    Mobility Interventions: Float heels, HOB 30 degrees or less, Pressure redistribution bed/mattress (bed type)    Nutrition Interventions: Document food/fluid/supplement intake    Friction and Shear Interventions: HOB 30 degrees or less, Lift sheet, Minimize layers                Problem: Patient Education: Go to Patient Education Activity  Goal: Patient/Family Education  Outcome: Progressing Towards Goal     Problem: Airway Clearance - Ineffective  Goal: Achieve or maintain patent airway  Outcome: Progressing Towards Goal     Problem: Gas Exchange - Impaired  Goal: Absence of hypoxia  Outcome: Progressing Towards Goal  Goal: Promote optimal lung function  Outcome: Progressing Towards Goal     Problem: Breathing Pattern - Ineffective  Goal: Ability to achieve and maintain a regular respiratory rate  Outcome: Progressing Towards Goal     Problem: Body Temperature -  Risk of, Imbalanced  Goal: Ability to maintain a body temperature within defined limits  Outcome: Progressing Towards Goal  Goal: Will regain or maintain usual level of consciousness  Outcome: Progressing Towards Goal  Goal: Complications related to the disease process, condition or treatment will be avoided or minimized  Outcome: Progressing Towards Goal     Problem: Isolation Precautions - Risk of Spread of Infection  Goal: Prevent transmission of infectious organism to others  Outcome: Progressing Towards Goal     Problem: Nutrition Deficits  Goal: Optimize nutrtional status  Outcome: Progressing Towards Goal     Problem: Risk for Fluid Volume Deficit  Goal: Maintain normal heart rhythm  Outcome: Progressing Towards Goal  Goal: Maintain absence of muscle cramping  Outcome: Progressing Towards Goal  Goal: Maintain normal serum potassium, sodium, calcium, phosphorus, and pH  Outcome: Progressing Towards Goal     Problem: Loneliness or Risk for Loneliness  Goal: Demonstrate positive use of time alone when socialization is not possible  Outcome: Progressing Towards Goal     Problem: Fatigue  Goal: Verbalize increase energy and improved vitality  Outcome: Progressing Towards Goal     Problem: Patient Education: Go to Patient Education Activity  Goal: Patient/Family Education  Outcome: Progressing Towards Goal     Problem: Falls - Risk of  Goal: *Absence of Falls  Description: Document Skyler Fall Risk and appropriate interventions in the flowsheet.   Outcome: Progressing Towards Goal     Problem: Patient Education: Go to Patient Education Activity  Goal: Patient/Family Education  Outcome: Progressing Towards Goal

## 2021-08-15 NOTE — ACP (ADVANCE CARE PLANNING)
Advance Care Planning     Advance Care Planning (ACP) Physician/NP/PA Conversation      Date of Conversation: 8/14/2021  Conducted with: Patient with [address] Decision Maker:     Click here to complete [address] including selection of the [address] Relationship (ie \"Primary\")  Today we documented Decision Maker(s) consistent with Legal Next of Kin hierarchy. Care Preferences:    Hospitalization: \"If your health worsens and it becomes clear that your chance of recovery is unlikely, what would be your preference regarding hospitalization? \"  The patient would prefer hospitalization. Ventilation: \"If you were unable to breathe on your own and your chance of recovery was unlikely, what would be your preference about the use of a ventilator (breathing machine) if it was available to you? \"   The patient would NOT desire the use of a ventilator. Resuscitation: \"In the event your heart stopped as a result of an underlying serious health condition, would you want attempts to be made to restart your heart, or would you prefer a natural death? \"   No, do NOT attempt to resuscitate. Additional topics discussed: treatment goals, benefit/burden of treatment options, hospitalization preferences and resuscitation preferences    Long discussion with patient. Increased O2 requirements overnight and now on HFNC. Discussed high risk of progressing to require ICU level of care and potentially intubation. Reviewed poor chance of success of patients with COVID 19 after being intubated. Pt had thought about his preferences overnight and has decided to let \"Diallo be in charge\". He states if he were to pass away, he would not want CPR, and would not want to be on a breathing machine. He would like to change his code status to DNR. He affirmed his mPOA is his wife.    We discussed doing everthing we can to try and prevent him from worsening and requiring higher level of care.     Conversation Outcomes / Follow-Up Plan:   ACP complete - no further action today  Reviewed DNR/DNI and patient elects DNR order - referred to ACP Clinical Specialist & placed order     Length of Voluntary ACP Conversation in minutes:  20 minutes    Marcella French MD

## 2021-08-15 NOTE — CONSULTS
Name: YULIA COTTRELL LP: [address]   : 1950 Admit Date: 2021   Phone: [phone]  Room: [facility]/   PCP: Joseph Merritt MD  MRN: [id number]   Date: 8/15/2021  Code: Full Code          Chart and notes reviewed. Data reviewed. I review the patient's current medications in the medical record at each encounter. I have evaluated and examined the patient. HPI:    10:16 AM       History was obtained from patient. I was asked by Cash Dodson MD to see Madhavi Dacosta in consultation for a chief complaint of COVID19 PNA and hypoxia. History of Present Illness:  Presented to ED on 21 with complaints of SOB and low O2. Symptoms began about a week ago. Positive COVID19 test on 21. Hypoxic in ED. Sats in the 80's. Was placed on 4L NC. Within the last 24 hours, is now requiring 12L mid flow O2. He has complaints today of some SOB and cough. Denies wheezing, chest pain. He is wanting paper and pen so that he can write a note to his wife in case something happens to him in hospital.     He has a history of DM and HTN. No history of lung disease. Afebrile  BP and HR stable  Oxygen saturations stable on 12L midflow  D-dimer 1.42  CRP 12.00  Creatinine 1.08    Images reviewed:  CXR : Bibasilar patchy infiltrates. No past medical history on file. No past surgical history on file. No family history on file.     Social History     Tobacco Use    Smoking status: Not on file   Substance Use Topics    Alcohol use: Not on file       Allergies   Allergen Reactions    Shellfish Derived Other (comments)       Current Facility-Administered Medications   Medication Dose Route Frequency    insulin glargine (LANTUS) injection 20 Units  20 Units SubCUTAneous DAILY    enoxaparin (LOVENOX) injection 30 mg  30 mg SubCUTAneous Q12H    acetaminophen (TYLENOL) tablet 650 mg  650 mg Oral Q6H PRN    Or    acetaminophen (TYLENOL) suppository 650 mg  650 mg Rectal Q6H PRN    dexamethasone (PF) (DECADRON) 10 mg/mL injection 6 mg  6 mg IntraVENous Q24H    cholecalciferol (VITAMIN D3) (1000 Units /25 mcg) tablet 2,000 Units  2,000 Units Oral DAILY    insulin lispro (HUMALOG) injection   SubCUTAneous AC&HS    glucose chewable tablet 16 g  4 Tablet Oral PRN    dextrose (D50W) injection syrg 12.5-25 g  12.5-25 g IntraVENous PRN    glucagon (GLUCAGEN) injection 1 mg  1 mg IntraMUSCular PRN    albuterol (PROVENTIL HFA, VENTOLIN HFA, PROAIR HFA) inhaler 2 Puff  2 Puff Inhalation Q4H PRN    ascorbic acid (vitamin C) (VITAMIN C) tablet 500 mg  500 mg Oral BID    zinc sulfate (ZINCATE) 50 mg zinc (220 mg) capsule 1 Capsule  1 Capsule Oral DAILY    sodium chloride (NS) flush 5-40 mL  5-40 mL IntraVENous Q8H    sodium chloride (NS) flush 5-40 mL  5-40 mL IntraVENous PRN    aspirin chewable tablet 81 mg  81 mg Oral DAILY    losartan (COZAAR) tablet 50 mg  50 mg Oral DAILY    atorvastatin (LIPITOR) tablet 20 mg  20 mg Oral DAILY         REVIEW OF SYSTEMS   12 point ROS negative except as stated in the HPI. Physical Exam:   Visit Vitals  /74   Pulse 67   Temp 97.3 °F (36.3 °C)   Resp 18   Ht 6' (1.829 m)   Wt 94.8 kg (209 lb)   SpO2 91%   BMI 28.35 kg/m²       General:  Alert, cooperative, no distress, appears stated age. Head:  Normocephalic, without obvious abnormality, atraumatic. Eyes:  Conjunctivae/corneas clear. Nose: Nares normal. Septum midline. Mucosa normal.    Throat: Lips, mucosa, and tongue normal.    Neck: Supple, symmetrical, trachea midline, no adenopathy. Lungs:   Scattered rhonchi; diminished at bases. Chest wall:  No tenderness or deformity. Heart:  Regular rate and rhythm, S1, S2 normal, no murmur, click, rub or gallop. Abdomen:   Soft, non-tender. Bowel sounds normal. No masses,  No organomegaly. Extremities: Extremities normal, atraumatic, no cyanosis or edema. Pulses: 2+ and symmetric all extremities.    Skin: Skin color, texture, turgor normal. No rashes or lesions   Lymph nodes: Cervical, supraclavicular nodes normal.   Neurologic: Grossly nonfocal       Lab Results   Component Value Date/Time    Sodium 137 08/15/2021 07:43 AM    Potassium 4.4 08/15/2021 07:43 AM    Chloride 107 08/15/2021 07:43 AM    CO2 24 08/15/2021 07:43 AM    BUN 33 (H) 08/15/2021 07:43 AM    Creatinine 1.08 08/15/2021 07:43 AM    Glucose 312 (H) 08/15/2021 07:43 AM    Calcium 9.3 08/15/2021 07:43 AM    Magnesium 2.0 08/14/2021 05:58 AM    Lactic acid 1.6 08/14/2021 06:19 AM       Lab Results   Component Value Date/Time    WBC 6.5 08/15/2021 07:43 AM    HGB 15.8 08/15/2021 07:43 AM    PLATELET 415 83/04/3696 07:43 AM    MCV 93.0 08/15/2021 07:43 AM       Lab Results   Component Value Date/Time    INR 1.1 08/15/2021 07:43 AM    Alk.  phosphatase 89 08/15/2021 07:43 AM    Protein, total 7.4 08/15/2021 07:43 AM    Albumin 2.2 (L) 08/15/2021 07:43 AM    Globulin 5.2 (H) 08/15/2021 07:43 AM       Lab Results   Component Value Date/Time    Ferritin 1,723 (H) 08/15/2021 07:43 AM       Lab Results   Component Value Date/Time    C-Reactive protein 12.00 (H) 08/15/2021 07:43 AM        No results found for: PH, PHI, PCO2, PCO2I, PO2, PO2I, HCO3, HCO3I, FIO2, FIO2I    Lab Results   Component Value Date/Time    Troponin-I, Qt. <0.05 08/15/2021 07:43 AM        No results found for: CULT    No results found for: TOXA1, RPR, HBCM, HBSAG, HAAB, HCAB1, HAAT, G6PD, CRYAC, HIVGT, HIVR, HIV1, HIV12, HIVPC, HIVRPI    No results found for: VANCT, CPK    No results found for: COLOR, APPRN, SPGRU, KYLER, PROTU, GLUCU, KETU, BILU, BLDU, UROU, AQUILES, LEUKU, WBCU, RBCU, UEPI, BACTU, CASTS, UCRY    IMPRESSION  · Acute hypoxic respiratory failure  · COVID19 PNA  · HTN  · DM    PLAN  · Maintain O2 sats > 90%; currently on 12L midflow  · Low threshold for highflow/BiPAP if continues to worsen  · Will add Remdesivir x 5 days and Actemra today as well  · Dexamethasone x 10 day  · Encourage IS  · DVT prophylaxis: Lovenox    Thank you for allowing us to participate in the care of this patient. We will be happy to follow along in his progress with you.     Duran Siddiqi NP

## 2021-08-15 NOTE — PROGRESS NOTES
Bedside and Verbal shift change report given to Donald Dobbins RN (oncoming nurse) by Luiz Lama RN (offgoing nurse). Report included the following information SBAR, Kardex, ED Summary, Procedure Summary, Intake/Output, MAR, Cardiac Rhythm SB, SR and Quality Measures.

## 2021-08-16 LAB
25(OH)D3 SERPL-MCNC: 44.2 NG/ML (ref 30–100)
ABO + RH BLD: NORMAL
ANION GAP SERPL CALC-SCNC: 6 MMOL/L (ref 5–15)
BASOPHILS # BLD: 0 K/UL (ref 0–0.1)
BASOPHILS NFR BLD: 0 % (ref 0–1)
BLOOD GROUP ANTIBODIES SERPL: NORMAL
BNP SERPL-MCNC: 306 PG/ML
BUN SERPL-MCNC: 35 MG/DL (ref 6–20)
BUN/CREAT SERPL: 27 (ref 12–20)
CALCIUM SERPL-MCNC: 9.3 MG/DL (ref 8.5–10.1)
CHLORIDE SERPL-SCNC: 104 MMOL/L (ref 97–108)
CO2 SERPL-SCNC: 24 MMOL/L (ref 21–32)
COMMENT, HOLDF: NORMAL
COMMENT, HOLDF: NORMAL
CREAT SERPL-MCNC: 1.28 MG/DL (ref 0.7–1.3)
CRP SERPL-MCNC: 4.92 MG/DL (ref 0–0.6)
D DIMER PPP FEU-MCNC: 0.97 MG/L FEU (ref 0–0.65)
DIFFERENTIAL METHOD BLD: ABNORMAL
EOSINOPHIL # BLD: 0 K/UL (ref 0–0.4)
EOSINOPHIL NFR BLD: 0 % (ref 0–7)
ERYTHROCYTE [DISTWIDTH] IN BLOOD BY AUTOMATED COUNT: 12.1 % (ref 11.5–14.5)
GLUCOSE BLD STRIP.AUTO-MCNC: 278 MG/DL (ref 65–117)
GLUCOSE BLD STRIP.AUTO-MCNC: 348 MG/DL (ref 65–117)
GLUCOSE BLD STRIP.AUTO-MCNC: 375 MG/DL (ref 65–117)
GLUCOSE BLD STRIP.AUTO-MCNC: 431 MG/DL (ref 65–117)
GLUCOSE SERPL-MCNC: 418 MG/DL (ref 65–100)
HCT VFR BLD AUTO: 47.3 % (ref 36.6–50.3)
HGB BLD-MCNC: 14.9 G/DL (ref 12.1–17)
IMM GRANULOCYTES # BLD AUTO: 0 K/UL
IMM GRANULOCYTES NFR BLD AUTO: 0 %
LYMPHOCYTES # BLD: 1 K/UL (ref 0.8–3.5)
LYMPHOCYTES NFR BLD: 11 % (ref 12–49)
MAGNESIUM SERPL-MCNC: 2.1 MG/DL (ref 1.6–2.4)
MCH RBC QN AUTO: 29.4 PG (ref 26–34)
MCHC RBC AUTO-ENTMCNC: 31.5 G/DL (ref 30–36.5)
MCV RBC AUTO: 93.3 FL (ref 80–99)
MONOCYTES # BLD: 0.5 K/UL (ref 0–1)
MONOCYTES NFR BLD: 6 % (ref 5–13)
MYELOCYTES NFR BLD MANUAL: 2 %
NEUTS SEG # BLD: 7.3 K/UL (ref 1.8–8)
NEUTS SEG NFR BLD: 81 % (ref 32–75)
NRBC # BLD: 0 K/UL (ref 0–0.01)
NRBC BLD-RTO: 0 PER 100 WBC
PHOSPHATE SERPL-MCNC: 3.6 MG/DL (ref 2.6–4.7)
PLATELET # BLD AUTO: 329 K/UL (ref 150–400)
PMV BLD AUTO: 12.1 FL (ref 8.9–12.9)
POTASSIUM SERPL-SCNC: 4.5 MMOL/L (ref 3.5–5.1)
RBC # BLD AUTO: 5.07 M/UL (ref 4.1–5.7)
RBC MORPH BLD: ABNORMAL
SAMPLES BEING HELD,HOLD: NORMAL
SAMPLES BEING HELD,HOLD: NORMAL
SERVICE CMNT-IMP: ABNORMAL
SODIUM SERPL-SCNC: 134 MMOL/L (ref 136–145)
SPECIMEN EXP DATE BLD: NORMAL
WBC # BLD AUTO: 9 K/UL (ref 4.1–11.1)

## 2021-08-16 PROCEDURE — 36415 COLL VENOUS BLD VENIPUNCTURE: CPT

## 2021-08-16 PROCEDURE — 83880 ASSAY OF NATRIURETIC PEPTIDE: CPT

## 2021-08-16 PROCEDURE — 83735 ASSAY OF MAGNESIUM: CPT

## 2021-08-16 PROCEDURE — 94640 AIRWAY INHALATION TREATMENT: CPT

## 2021-08-16 PROCEDURE — 84100 ASSAY OF PHOSPHORUS: CPT

## 2021-08-16 PROCEDURE — 74011636637 HC RX REV CODE- 636/637: Performed by: INTERNAL MEDICINE

## 2021-08-16 PROCEDURE — 82962 GLUCOSE BLOOD TEST: CPT

## 2021-08-16 PROCEDURE — 74011636637 HC RX REV CODE- 636/637: Performed by: HOSPITALIST

## 2021-08-16 PROCEDURE — 74011636637 HC RX REV CODE- 636/637: Performed by: NURSE PRACTITIONER

## 2021-08-16 PROCEDURE — 86140 C-REACTIVE PROTEIN: CPT

## 2021-08-16 PROCEDURE — 80048 BASIC METABOLIC PNL TOTAL CA: CPT

## 2021-08-16 PROCEDURE — 85379 FIBRIN DEGRADATION QUANT: CPT

## 2021-08-16 PROCEDURE — 74011250636 HC RX REV CODE- 250/636: Performed by: HOSPITALIST

## 2021-08-16 PROCEDURE — 74011250636 HC RX REV CODE- 250/636: Performed by: PHYSICIAN ASSISTANT

## 2021-08-16 PROCEDURE — 74011250637 HC RX REV CODE- 250/637: Performed by: HOSPITALIST

## 2021-08-16 PROCEDURE — 74011250637 HC RX REV CODE- 250/637: Performed by: NURSE PRACTITIONER

## 2021-08-16 PROCEDURE — 82306 VITAMIN D 25 HYDROXY: CPT

## 2021-08-16 PROCEDURE — 86901 BLOOD TYPING SEROLOGIC RH(D): CPT

## 2021-08-16 PROCEDURE — 74011250636 HC RX REV CODE- 250/636: Performed by: INTERNAL MEDICINE

## 2021-08-16 PROCEDURE — 65660000001 HC RM ICU INTERMED STEPDOWN

## 2021-08-16 PROCEDURE — 77030012341 HC CHMB SPCR OPTC MDI VYRM -A

## 2021-08-16 PROCEDURE — 85025 COMPLETE CBC W/AUTO DIFF WBC: CPT

## 2021-08-16 RX ORDER — INSULIN GLARGINE 100 [IU]/ML
40 INJECTION, SOLUTION SUBCUTANEOUS DAILY
Status: DISCONTINUED | OUTPATIENT
Start: 2021-08-17 | End: 2021-08-17

## 2021-08-16 RX ORDER — FUROSEMIDE 10 MG/ML
20 INJECTION INTRAMUSCULAR; INTRAVENOUS ONCE
Status: COMPLETED | OUTPATIENT
Start: 2021-08-16 | End: 2021-08-16

## 2021-08-16 RX ORDER — INSULIN LISPRO 100 [IU]/ML
6 INJECTION, SOLUTION INTRAVENOUS; SUBCUTANEOUS ONCE
Status: COMPLETED | OUTPATIENT
Start: 2021-08-16 | End: 2021-08-16

## 2021-08-16 RX ORDER — INSULIN LISPRO 100 [IU]/ML
6 INJECTION, SOLUTION INTRAVENOUS; SUBCUTANEOUS
Status: DISCONTINUED | OUTPATIENT
Start: 2021-08-16 | End: 2021-08-18

## 2021-08-16 RX ORDER — FUROSEMIDE 10 MG/ML
40 INJECTION INTRAMUSCULAR; INTRAVENOUS ONCE
Status: COMPLETED | OUTPATIENT
Start: 2021-08-16 | End: 2021-08-16

## 2021-08-16 RX ADMIN — Medication 10 ML: at 17:27

## 2021-08-16 RX ADMIN — INSULIN GLARGINE 20 UNITS: 100 INJECTION, SOLUTION SUBCUTANEOUS at 10:50

## 2021-08-16 RX ADMIN — FUROSEMIDE 20 MG: 10 INJECTION, SOLUTION INTRAMUSCULAR; INTRAVENOUS at 12:38

## 2021-08-16 RX ADMIN — OXYCODONE HYDROCHLORIDE AND ACETAMINOPHEN 500 MG: 500 TABLET ORAL at 10:50

## 2021-08-16 RX ADMIN — ASPIRIN 81 MG: 81 TABLET, CHEWABLE ORAL at 10:49

## 2021-08-16 RX ADMIN — ALBUTEROL SULFATE 2 PUFF: 90 AEROSOL, METERED RESPIRATORY (INHALATION) at 12:19

## 2021-08-16 RX ADMIN — INSULIN LISPRO 7 UNITS: 100 INJECTION, SOLUTION INTRAVENOUS; SUBCUTANEOUS at 07:18

## 2021-08-16 RX ADMIN — ALBUTEROL SULFATE 2 PUFF: 90 AEROSOL, METERED RESPIRATORY (INHALATION) at 15:26

## 2021-08-16 RX ADMIN — ZINC SULFATE 220 MG (50 MG) CAPSULE 1 CAPSULE: CAPSULE at 10:49

## 2021-08-16 RX ADMIN — FUROSEMIDE 40 MG: 10 INJECTION, SOLUTION INTRAMUSCULAR; INTRAVENOUS at 10:48

## 2021-08-16 RX ADMIN — ATORVASTATIN CALCIUM 20 MG: 20 TABLET, FILM COATED ORAL at 10:49

## 2021-08-16 RX ADMIN — DEXAMETHASONE SODIUM PHOSPHATE 6 MG: 10 INJECTION INTRAMUSCULAR; INTRAVENOUS at 12:30

## 2021-08-16 RX ADMIN — ENOXAPARIN SODIUM 30 MG: 40 INJECTION SUBCUTANEOUS at 17:26

## 2021-08-16 RX ADMIN — INSULIN LISPRO 10 UNITS: 100 INJECTION, SOLUTION INTRAVENOUS; SUBCUTANEOUS at 17:26

## 2021-08-16 RX ADMIN — Medication 2000 UNITS: at 10:50

## 2021-08-16 RX ADMIN — Medication 10 ML: at 22:23

## 2021-08-16 RX ADMIN — OXYCODONE HYDROCHLORIDE AND ACETAMINOPHEN 500 MG: 500 TABLET ORAL at 17:26

## 2021-08-16 RX ADMIN — LOSARTAN POTASSIUM 50 MG: 50 TABLET, FILM COATED ORAL at 10:49

## 2021-08-16 RX ADMIN — ENOXAPARIN SODIUM 30 MG: 40 INJECTION SUBCUTANEOUS at 07:17

## 2021-08-16 RX ADMIN — INSULIN LISPRO 6 UNITS: 100 INJECTION, SOLUTION INTRAVENOUS; SUBCUTANEOUS at 23:05

## 2021-08-16 RX ADMIN — INSULIN LISPRO 5 UNITS: 100 INJECTION, SOLUTION INTRAVENOUS; SUBCUTANEOUS at 12:30

## 2021-08-16 NOTE — PROGRESS NOTES
Transition of Care:    1. RUR-12%    2. Disposition: TBD: Possibly home with wife with follow up instructions. Awaiting recs from MD. Possible home O2    3. PCP F/u: Wife reports patient sees  [phone] at [facility]. Reports that she does not believe he has been see since the Fall of 2020    4. Transport: TBD: Wife agreeable to transport patient if discharged to home. 1500: CM telephone call to patient's wife, Jason Dean. Confirmed patient demographic information. No other contact to list under emergency contact. Wife states that it is just the two of them and they do not have any additional family. Reports that they live in a single family home and that patient has been independent of all ADLs up until recent hospitalization. Denied use of walker, wheelchair, or cane. Reports using [facility] [address] [place], [place]. Will continue to monitor to follow and assist with NIDA needs as they arise    Medicare pt representative has received, reviewed, and signed 1st IM letter informing them of their right to appeal the discharge. Signed copy has been placed on pt bedside chart. Reason for Admission:  COVID-19                     RUR Score:   12%                  Plan for utilizing home health:  None indicated at this time      PCP: First and Last name:  Isra Ann MD     Name of Practice: [facility]   Are you a current patient: Yes/No: yes   Approximate date of last visit: Fall 2020   Can you participate in a virtual visit with your PCP: no                    Current Advanced Directive/Advance Care Plan: DNR      Healthcare Decision Maker: Charmaine Felix Aegolge-[phone].  Reports that she believes that she has documents that were filled out at [facility]  Click here to [address] including selection of the Healthcare Decision Maker Relationship (ie \"Primary\")                             Transition of Care Plan:  TBD: Likely home with instructions vs Home with O2. Awaiting MD stout. Care Management Interventions  PCP Verified by CM: Yes (Dr. Charlton Presume of last visit.  Believes to be sometime in the fall)  Mode of Transport at Discharge:  ([phone])  Current Support Network: Lives with Spouse  Confirm Follow Up Transport: Family (TBD-wife likely to transport home)  Discharge Location  Discharge Placement: Home (TBD: home with followup instructions)    Shima Kemp RN, CRM

## 2021-08-16 NOTE — PROGRESS NOTES
Bedside and Verbal shift change report given to [address] (oncoming nurse) by Josh Frances (offgoing nurse). Report included the following information SBAR, Kardex, ED Summary, Procedure Summary, Intake/Output, MAR, Recent Results and Cardiac Rhythm NSR.

## 2021-08-16 NOTE — PROGRESS NOTES
Pulmonary, Critical Care, and Sleep Medicine~Progress Note    Name: Marty Jerry MRN: [id number]   : 1950 Hospital: [address]    Date: 2021 12:00 PM Admission: 2021     Impression Plan   1. Acute hypoxic resp failure   2. COVID19 + PNA. Unvaccinated  3. DM II  4. HTN  5. DNR 1. S/p Actemra on 8/15  2. Continue decadron for now  3. lovenox proph  4. diuresis today  5. O2 titration above 90%  6. Usual labs      Daily Progression:    On midflow settings. Breathing stable     I have reviewed the labs and previous days notes. Pertinent items are noted in HPI.     OBJECTIVE:     Vital Signs:       Visit Vitals  BP (!) 142/76 (BP 1 Location: Left arm, BP Patient Position: At rest)   Pulse 67   Temp 98 °F (36.7 °C)   Resp 22   Ht 6' (1.829 m)   Wt 94.1 kg (207 lb 8 oz)   SpO2 90%   BMI 28.14 kg/m²      Temp (24hrs), Av.8 °F (36.6 °C), Min:97.2 °F (36.2 °C), Max:98 °F (36.7 °C)     Intake/Output:     Last shift:  07 - 1900  In: -   Out: 400 [Urine:400]    Last 3 shifts: 1901 -  0700  In: -   Out: 600 [Urine:600]          Intake/Output Summary (Last 24 hours) at 2021 1200  Last data filed at 2021 1462  Gross per 24 hour   Intake    Output 1000 ml   Net -1000 ml       Physical Exam:                                        Exam Findings Other   General: No resp distress noted, appears stated age    HEENT:  No ulcers, JVD not elevated, no cervical LAD    Chest: No pectus deformity, normal chest rise b/l    HEART:  No visible thrills    Lungs:  Normal expansion     ABD: Soft/NT, non rigid mildly distended    EXT: No cyanosis/clubbing/edema, normal peripheral pulses    Skin: No rashes or ulcers, no mottling    Neuro: A/O x 3        Medications:  Current Facility-Administered Medications   Medication Dose Route Frequency    insulin glargine (LANTUS) injection 20 Units  20 Units SubCUTAneous DAILY    albuterol (PROVENTIL HFA, VENTOLIN HFA, PROAIR HFA) inhaler 2 Puff  2 Puff Inhalation Q4H RT    enoxaparin (LOVENOX) injection 30 mg  30 mg SubCUTAneous Q12H    acetaminophen (TYLENOL) tablet 650 mg  650 mg Oral Q6H PRN    Or    acetaminophen (TYLENOL) suppository 650 mg  650 mg Rectal Q6H PRN    dexamethasone (PF) (DECADRON) 10 mg/mL injection 6 mg  6 mg IntraVENous Q24H    cholecalciferol (VITAMIN D3) (1000 Units /25 mcg) tablet 2,000 Units  2,000 Units Oral DAILY    insulin lispro (HUMALOG) injection   SubCUTAneous AC&HS    glucose chewable tablet 16 g  4 Tablet Oral PRN    dextrose (D50W) injection syrg 12.5-25 g  12.5-25 g IntraVENous PRN    glucagon (GLUCAGEN) injection 1 mg  1 mg IntraMUSCular PRN    ascorbic acid (vitamin C) (VITAMIN C) tablet 500 mg  500 mg Oral BID    zinc sulfate (ZINCATE) 50 mg zinc (220 mg) capsule 1 Capsule  1 Capsule Oral DAILY    sodium chloride (NS) flush 5-40 mL  5-40 mL IntraVENous Q8H    sodium chloride (NS) flush 5-40 mL  5-40 mL IntraVENous PRN    aspirin chewable tablet 81 mg  81 mg Oral DAILY    losartan (COZAAR) tablet 50 mg  50 mg Oral DAILY    atorvastatin (LIPITOR) tablet 20 mg  20 mg Oral DAILY       Labs:  ABG No results for input(s): PHI, PCO2I, PO2I, HCO3I, SO2I, FIO2I in the last 72 hours.      CBC Recent Labs     08/16/21  0135 08/15/21  0743 08/14/21  0558   WBC 9.0 6.5 8.9   HGB 14.9 15.8 15.8   HCT 47.3 49.4 48.7    283 255   MCV 93.3 93.0 92.9   MCH 29.4 29.8 38.3        Metabolic  Panel Recent Labs     08/16/21  0135 08/15/21  0743 08/14/21  0558   * 141  137 137   K 4.5 4.8  4.4 4.3    106  107 104   CO2 24 26  24 26   * 308*  312* 182*   BUN 35* 33*  33* 32*   CREA 1.28 0.96  1.08 1.31*   CA 9.3 9.0  9.3 9.0   MG 2.1  --  2.0   PHOS 3.6  --   --    ALB  --  2.2* 2.5*   ALT  --  29 26   INR  --  1.1  --         Pertinent Labs                Dilshad Daniel PA-C  8/16/2021

## 2021-08-16 NOTE — PROGRESS NOTES
Verbal shift change report given to Edie Elders RN (oncoming nurse) by Melida Cain RN (offgoing nurse). Report included the following information SBAR, Kardex and MAR.

## 2021-08-16 NOTE — PROGRESS NOTES
Manfred with respiratory therapy who said she went down to [address] but unable to find the inhaler and she sent a note to pharmacy. Waiting to hear back.

## 2021-08-16 NOTE — PROGRESS NOTES
Unable to get med list at this time patient does not remember home meds. Only recalls metformin but unsure of the dosage.

## 2021-08-16 NOTE — PROGRESS NOTES
[facility]  Hospitalist Group                                                                                          Hospitalist Progress Note  Rex Mai MD  Answering service: [phone] from in house phone        Date of Service:  2021  NAME:  Jacquelnie Rock  :  1950  MRN:  [id number]      Admission Summary: This is a 75-year-old  male with past medical history significant for type 2 diabetes mellitus, hypertension, and hypercholesterolemia, who presented to [facility] emergency department with low oxygen 84% at home and fever, 1-2 days. Interval history / Subjective:   Feels a bit better today. O2 requirement decreased last night, now on 15LNC  No N/V/Abdominal pain. Assessment & Plan:     COVID 19  Acute hypoxic respiratory failure  - O2 to maintain saturations over 90%, currently on 15HFNC  - Start high flow nasal cannula and titrate up   - Continue dexamethasone, VitC, Zinc, VitD  - Out of the window for Remdesivir per hospital policy   - Encouraged prone positioning, incentive spirometry   - Procalcitonin is 0.26 hold antibiotics for now   - Monitor inflammatory markers   - AC prophylaxis per protocol   - s/p Actemra 8/15  - pulmonary following  - Add lasix today     Type 2 diabetes - A1c 10.7%  - SSI, POCT glucose checks and hypoglycemia protocol   - Increase insulin to 40U   - Consult DTC    HTN - Continue losartan and monitor BP   HLD - statin     Code status: DNR, DDNR signed   DVT prophylaxis: lovenox     Care Plan discussed with: Patient/Family  Anticipated Disposition: Home w/Family  Anticipated Discharge: Greater than 48 hours     Hospital Problems  Never Reviewed        Codes Class Noted POA    Pneumonia due to COVID-19 virus ICD-10-CM: U07.1, J12.82  ICD-9-CM: 480.8, 079.89  2021 Unknown                Review of Systems:   A comprehensive review of systems was negative except for that written in the HPI. Vital Signs:    Last 24hrs VS reviewed since prior progress note. Most recent are:  Visit Vitals  /89 (BP 1 Location: Left arm, BP Patient Position: At rest)   Pulse 70   Temp 97.7 °F (36.5 °C)   Resp 22   Ht 6' (1.829 m)   Wt 94.1 kg (207 lb 8 oz)   SpO2 94%   BMI 28.14 kg/m²         Intake/Output Summary (Last 24 hours) at 8/16/2021 1624  Last data filed at 8/16/2021 1219  Gross per 24 hour   Intake    Output 1950 ml   Net -1950 ml        Physical Examination:     I had a face to face encounter with this patient and independently examined them on 8/16/2021 as outlined below:          Constitutional:  Mild resp distress, cooperative, pleasant    ENT:  Oral mucosa moist, oropharynx benign. Resp:  Crackles bilaterally. No wheezing/rhonchi/rales. Tachypnea. Hypoxic with low 90's on 15L    CV:  Regular rhythm, normal rate, no murmurs, gallops, rubs    GI:  Soft, non distended, non tender. normoactive bowel sounds, no hepatosplenomegaly     Musculoskeletal:  No edema, warm, 2+ pulses throughout    Neurologic:  Moves all extremities.   AAOx3, CN II-XII reviewed            Data Review:    Review and/or order of clinical lab test  Review and/or order of tests in the radiology section of CPT  Review and/or order of tests in the medicine section of CPT      Labs:     Recent Labs     08/16/21  0135 08/15/21  0743   WBC 9.0 6.5   HGB 14.9 15.8   HCT 47.3 49.4    283     Recent Labs     08/16/21  0135 08/15/21  0743 08/14/21  0558   * 141  137 137   K 4.5 4.8  4.4 4.3    106  107 104   CO2 24 26  24 26   BUN 35* 33*  33* 32*   CREA 1.28 0.96  1.08 1.31*   * 308*  312* 182*   CA 9.3 9.0  9.3 9.0   MG 2.1  --  2.0   PHOS 3.6  --   --      Recent Labs     08/15/21  0743 08/14/21  0558   ALT 29 26   AP 89 87   TBILI 0.6 0.8   TP 7.4 7.6   ALB 2.2* 2.5*   GLOB 5.2* 5.1*     Recent Labs     08/15/21  0743   INR 1.1   PTP 11.0      Recent Labs     08/15/21  0743   FERR 1,723*      No results found for: FOL, RBCF   No results for input(s): PH, PCO2, PO2 in the last 72 hours.   Recent Labs     08/15/21  0743   TROIQ <0.05     No results found for: CHOL, CHOLX, CHLST, CHOLV, HDL, HDLP, LDL, LDLC, DLDLP, TGLX, TRIGL, TRIGP, CHHD, CHHDX  Lab Results   Component Value Date/Time    Glucose (POC) 375 (H) 08/16/2021 04:03 PM    Glucose (POC) 278 (H) 08/16/2021 11:23 AM    Glucose (POC) 348 (H) 08/16/2021 06:48 AM    Glucose (POC) 442 (H) 08/15/2021 11:24 PM    Glucose (POC) 330 (H) 08/15/2021 05:38 PM     No results found for: COLOR, APPRN, SPGRU, REFSG, KYLER, PROTU, GLUCU, KETU, BILU, UROU, AQUILES, LEUKU, GLUKE, EPSU, BACTU, WBCU, RBCU, CASTS, UCRY      Medications Reviewed:     Current Facility-Administered Medications   Medication Dose Route Frequency    insulin glargine (LANTUS) injection 20 Units  20 Units SubCUTAneous DAILY    albuterol (PROVENTIL HFA, VENTOLIN HFA, PROAIR HFA) inhaler 2 Puff  2 Puff Inhalation Q4H RT    enoxaparin (LOVENOX) injection 30 mg  30 mg SubCUTAneous Q12H    acetaminophen (TYLENOL) tablet 650 mg  650 mg Oral Q6H PRN    Or    acetaminophen (TYLENOL) suppository 650 mg  650 mg Rectal Q6H PRN    dexamethasone (PF) (DECADRON) 10 mg/mL injection 6 mg  6 mg IntraVENous Q24H    cholecalciferol (VITAMIN D3) (1000 Units /25 mcg) tablet 2,000 Units  2,000 Units Oral DAILY    insulin lispro (HUMALOG) injection   SubCUTAneous AC&HS    glucose chewable tablet 16 g  4 Tablet Oral PRN    dextrose (D50W) injection syrg 12.5-25 g  12.5-25 g IntraVENous PRN    glucagon (GLUCAGEN) injection 1 mg  1 mg IntraMUSCular PRN    ascorbic acid (vitamin C) (VITAMIN C) tablet 500 mg  500 mg Oral BID    zinc sulfate (ZINCATE) 50 mg zinc (220 mg) capsule 1 Capsule  1 Capsule Oral DAILY    sodium chloride (NS) flush 5-40 mL  5-40 mL IntraVENous Q8H    sodium chloride (NS) flush 5-40 mL  5-40 mL IntraVENous PRN    aspirin chewable tablet 81 mg  81 mg Oral DAILY    losartan (COZAAR) tablet 50 mg  50 mg Oral DAILY    atorvastatin (LIPITOR) tablet 20 mg  20 mg Oral DAILY     ______________________________________________________________________  EXPECTED LENGTH OF STAY: - - -  ACTUAL LENGTH OF STAY:          2                 Jonathan Rosenberg MD

## 2021-08-16 NOTE — PROGRESS NOTES
Problem: Risk for Spread of Infection  Goal: Prevent transmission of infectious organism to others  Description: Prevent the transmission of infectious organisms to other patients, staff members, and visitors. Outcome: Progressing Towards Goal     Problem: Patient Education:  Go to Education Activity  Goal: Patient/Family Education  Outcome: Progressing Towards Goal     Problem: Diabetes Self-Management  Goal: *Disease process and treatment process  Description: Define diabetes and identify own type of diabetes; list 3 options for treating diabetes. Outcome: Progressing Towards Goal  Goal: *Incorporating nutritional management into lifestyle  Description: Describe effect of type, amount and timing of food on blood glucose; list 3 methods for planning meals. Outcome: Progressing Towards Goal  Goal: *Using medications safely  Description: State effect of diabetes medications on diabetes; name diabetes medication taking, action and side effects. Outcome: Progressing Towards Goal     Problem: Pressure Injury - Risk of  Goal: *Prevention of pressure injury  Description: Document Malik Scale and appropriate interventions in the flowsheet. Outcome: Progressing Towards Goal  Note: Pressure Injury Interventions:             Activity Interventions: Increase time out of bed    Mobility Interventions: PT/OT evaluation    Nutrition Interventions: Document food/fluid/supplement intake    Friction and Shear Interventions: Minimize layers                Problem: Airway Clearance - Ineffective  Goal: Achieve or maintain patent airway  Outcome: Progressing Towards Goal     Problem: Gas Exchange - Impaired  Goal: Absence of hypoxia  Outcome: Progressing Towards Goal     Problem: Breathing Pattern - Ineffective  Goal: Ability to achieve and maintain a regular respiratory rate  Outcome: Progressing Towards Goal     Problem: Risk for Fluid Volume Deficit  Goal: Maintain normal heart rhythm  Outcome: Progressing Towards Goal  Goal: Maintain absence of muscle cramping  Outcome: Progressing Towards Goal  Goal: Maintain normal serum potassium, sodium, calcium, phosphorus, and pH  Outcome: Progressing Towards Goal     Problem: Falls - Risk of  Goal: *Absence of Falls  Description: Document Skyler Fall Risk and appropriate interventions in the flowsheet.   Outcome: Progressing Towards Goal  Note: Fall Risk Interventions:            Medication Interventions: Teach patient to arise slowly

## 2021-08-17 LAB
ANION GAP SERPL CALC-SCNC: 7 MMOL/L (ref 5–15)
BASOPHILS # BLD: 0 K/UL (ref 0–0.1)
BASOPHILS NFR BLD: 0 % (ref 0–1)
BNP SERPL-MCNC: 252 PG/ML
BUN SERPL-MCNC: 38 MG/DL (ref 6–20)
BUN/CREAT SERPL: 26 (ref 12–20)
CALCIUM SERPL-MCNC: 9.3 MG/DL (ref 8.5–10.1)
CHLORIDE SERPL-SCNC: 102 MMOL/L (ref 97–108)
CO2 SERPL-SCNC: 29 MMOL/L (ref 21–32)
CREAT SERPL-MCNC: 1.49 MG/DL (ref 0.7–1.3)
CRP SERPL-MCNC: 4.06 MG/DL (ref 0–0.6)
D DIMER PPP FEU-MCNC: 0.91 MG/L FEU (ref 0–0.65)
DIFFERENTIAL METHOD BLD: ABNORMAL
EOSINOPHIL # BLD: 0 K/UL (ref 0–0.4)
EOSINOPHIL NFR BLD: 0 % (ref 0–7)
ERYTHROCYTE [DISTWIDTH] IN BLOOD BY AUTOMATED COUNT: 12.2 % (ref 11.5–14.5)
GLUCOSE BLD STRIP.AUTO-MCNC: 318 MG/DL (ref 65–117)
GLUCOSE BLD STRIP.AUTO-MCNC: 337 MG/DL (ref 65–117)
GLUCOSE BLD STRIP.AUTO-MCNC: 348 MG/DL (ref 65–117)
GLUCOSE BLD STRIP.AUTO-MCNC: 457 MG/DL (ref 65–117)
GLUCOSE SERPL-MCNC: 362 MG/DL (ref 65–100)
HCT VFR BLD AUTO: 52.3 % (ref 36.6–50.3)
HGB BLD-MCNC: 16.8 G/DL (ref 12.1–17)
IMM GRANULOCYTES # BLD AUTO: 0 K/UL
IMM GRANULOCYTES NFR BLD AUTO: 0 %
LYMPHOCYTES # BLD: 1.5 K/UL (ref 0.8–3.5)
LYMPHOCYTES NFR BLD: 17 % (ref 12–49)
MAGNESIUM SERPL-MCNC: 1.9 MG/DL (ref 1.6–2.4)
MCH RBC QN AUTO: 29.9 PG (ref 26–34)
MCHC RBC AUTO-ENTMCNC: 32.1 G/DL (ref 30–36.5)
MCV RBC AUTO: 93.1 FL (ref 80–99)
MONOCYTES # BLD: 0.5 K/UL (ref 0–1)
MONOCYTES NFR BLD: 6 % (ref 5–13)
NEUTS SEG # BLD: 6.9 K/UL (ref 1.8–8)
NEUTS SEG NFR BLD: 77 % (ref 32–75)
NRBC # BLD: 0 K/UL (ref 0–0.01)
NRBC BLD-RTO: 0 PER 100 WBC
PHOSPHATE SERPL-MCNC: 4.8 MG/DL (ref 2.6–4.7)
PLATELET # BLD AUTO: 348 K/UL (ref 150–400)
PMV BLD AUTO: 11.8 FL (ref 8.9–12.9)
POTASSIUM SERPL-SCNC: 4.1 MMOL/L (ref 3.5–5.1)
RBC # BLD AUTO: 5.62 M/UL (ref 4.1–5.7)
RBC MORPH BLD: ABNORMAL
SERVICE CMNT-IMP: ABNORMAL
SODIUM SERPL-SCNC: 138 MMOL/L (ref 136–145)
WBC # BLD AUTO: 8.9 K/UL (ref 4.1–11.1)
WBC MORPH BLD: ABNORMAL

## 2021-08-17 PROCEDURE — 85025 COMPLETE CBC W/AUTO DIFF WBC: CPT

## 2021-08-17 PROCEDURE — 74011250636 HC RX REV CODE- 250/636: Performed by: HOSPITALIST

## 2021-08-17 PROCEDURE — 82962 GLUCOSE BLOOD TEST: CPT

## 2021-08-17 PROCEDURE — 36415 COLL VENOUS BLD VENIPUNCTURE: CPT

## 2021-08-17 PROCEDURE — 86140 C-REACTIVE PROTEIN: CPT

## 2021-08-17 PROCEDURE — 65660000000 HC RM CCU STEPDOWN

## 2021-08-17 PROCEDURE — 85379 FIBRIN DEGRADATION QUANT: CPT

## 2021-08-17 PROCEDURE — 74011636637 HC RX REV CODE- 636/637: Performed by: HOSPITALIST

## 2021-08-17 PROCEDURE — 83880 ASSAY OF NATRIURETIC PEPTIDE: CPT

## 2021-08-17 PROCEDURE — 77010033711 HC HIGH FLOW OXYGEN

## 2021-08-17 PROCEDURE — 94640 AIRWAY INHALATION TREATMENT: CPT

## 2021-08-17 PROCEDURE — 74011636637 HC RX REV CODE- 636/637: Performed by: INTERNAL MEDICINE

## 2021-08-17 PROCEDURE — 84100 ASSAY OF PHOSPHORUS: CPT

## 2021-08-17 PROCEDURE — 74011250637 HC RX REV CODE- 250/637: Performed by: NURSE PRACTITIONER

## 2021-08-17 PROCEDURE — 80048 BASIC METABOLIC PNL TOTAL CA: CPT

## 2021-08-17 PROCEDURE — 99233 SBSQ HOSP IP/OBS HIGH 50: CPT | Performed by: CLINICAL NURSE SPECIALIST

## 2021-08-17 PROCEDURE — 83735 ASSAY OF MAGNESIUM: CPT

## 2021-08-17 PROCEDURE — 74011250637 HC RX REV CODE- 250/637: Performed by: HOSPITALIST

## 2021-08-17 RX ORDER — DEXTROSE 50 % IN WATER (D50W) INTRAVENOUS SYRINGE
12.5-25 AS NEEDED
Status: DISCONTINUED | OUTPATIENT
Start: 2021-08-17 | End: 2021-08-22 | Stop reason: HOSPADM

## 2021-08-17 RX ORDER — INSULIN LISPRO 100 [IU]/ML
INJECTION, SOLUTION INTRAVENOUS; SUBCUTANEOUS
Status: DISCONTINUED | OUTPATIENT
Start: 2021-08-17 | End: 2021-08-22 | Stop reason: HOSPADM

## 2021-08-17 RX ORDER — ALBUTEROL SULFATE 90 UG/1
2 AEROSOL, METERED RESPIRATORY (INHALATION)
Status: DISCONTINUED | OUTPATIENT
Start: 2021-08-17 | End: 2021-08-22 | Stop reason: HOSPADM

## 2021-08-17 RX ORDER — INSULIN GLARGINE 100 [IU]/ML
50 INJECTION, SOLUTION SUBCUTANEOUS DAILY
Status: DISCONTINUED | OUTPATIENT
Start: 2021-08-18 | End: 2021-08-22 | Stop reason: HOSPADM

## 2021-08-17 RX ADMIN — ALBUTEROL SULFATE 2 PUFF: 90 AEROSOL, METERED RESPIRATORY (INHALATION) at 03:48

## 2021-08-17 RX ADMIN — ALBUTEROL SULFATE 2 PUFF: 90 AEROSOL, METERED RESPIRATORY (INHALATION) at 08:51

## 2021-08-17 RX ADMIN — INSULIN LISPRO 6 UNITS: 100 INJECTION, SOLUTION INTRAVENOUS; SUBCUTANEOUS at 16:53

## 2021-08-17 RX ADMIN — ASPIRIN 81 MG: 81 TABLET, CHEWABLE ORAL at 09:29

## 2021-08-17 RX ADMIN — ENOXAPARIN SODIUM 30 MG: 40 INJECTION SUBCUTANEOUS at 18:23

## 2021-08-17 RX ADMIN — ATORVASTATIN CALCIUM 20 MG: 20 TABLET, FILM COATED ORAL at 09:29

## 2021-08-17 RX ADMIN — INSULIN LISPRO 12 UNITS: 100 INJECTION, SOLUTION INTRAVENOUS; SUBCUTANEOUS at 16:52

## 2021-08-17 RX ADMIN — INSULIN GLARGINE 40 UNITS: 100 INJECTION, SOLUTION SUBCUTANEOUS at 09:00

## 2021-08-17 RX ADMIN — Medication 10 ML: at 16:54

## 2021-08-17 RX ADMIN — DEXAMETHASONE SODIUM PHOSPHATE 6 MG: 10 INJECTION INTRAMUSCULAR; INTRAVENOUS at 12:23

## 2021-08-17 RX ADMIN — INSULIN LISPRO 7 UNITS: 100 INJECTION, SOLUTION INTRAVENOUS; SUBCUTANEOUS at 06:15

## 2021-08-17 RX ADMIN — OXYCODONE HYDROCHLORIDE AND ACETAMINOPHEN 500 MG: 500 TABLET ORAL at 18:23

## 2021-08-17 RX ADMIN — INSULIN LISPRO 6 UNITS: 100 INJECTION, SOLUTION INTRAVENOUS; SUBCUTANEOUS at 12:23

## 2021-08-17 RX ADMIN — ENOXAPARIN SODIUM 30 MG: 40 INJECTION SUBCUTANEOUS at 05:58

## 2021-08-17 RX ADMIN — INSULIN LISPRO 5 UNITS: 100 INJECTION, SOLUTION INTRAVENOUS; SUBCUTANEOUS at 22:51

## 2021-08-17 RX ADMIN — OXYCODONE HYDROCHLORIDE AND ACETAMINOPHEN 500 MG: 500 TABLET ORAL at 09:29

## 2021-08-17 RX ADMIN — Medication 10 ML: at 22:51

## 2021-08-17 RX ADMIN — INSULIN LISPRO 10 UNITS: 100 INJECTION, SOLUTION INTRAVENOUS; SUBCUTANEOUS at 12:22

## 2021-08-17 RX ADMIN — Medication 10 ML: at 05:59

## 2021-08-17 RX ADMIN — Medication 2000 UNITS: at 09:29

## 2021-08-17 RX ADMIN — ZINC SULFATE 220 MG (50 MG) CAPSULE 1 CAPSULE: CAPSULE at 09:29

## 2021-08-17 RX ADMIN — LOSARTAN POTASSIUM 50 MG: 50 TABLET, FILM COATED ORAL at 09:29

## 2021-08-17 RX ADMIN — INSULIN LISPRO 6 UNITS: 100 INJECTION, SOLUTION INTRAVENOUS; SUBCUTANEOUS at 06:16

## 2021-08-17 NOTE — PROGRESS NOTES
2000: Verbal shift change report given to Kartik Cleaning RN (oncoming nurse) by Manpreet Suggs (offgoing nurse). Report included the following information SBAR, Kardex, ED Summary, Intake/Output, MAR, Recent Results, Med Rec Status and Cardiac Rhythm NSR.     2238: Perfect Serve message sent to AVERY Hammer NP in regards to blood glucose result of 431. New orders received- see EMAR.    0730: Bedside and Verbal shift change report given to KATELIN Caraballo (oncoming nurse) by Kartik Cleaning RN (offgoing nurse). Report included the following information SBAR, Kardex, Procedure Summary, Intake/Output, MAR, Accordion, Recent Results, Cardiac Rhythm NSR and Alarm Parameters .

## 2021-08-17 NOTE — PROGRESS NOTES
Pulmonary, Critical Care, and Sleep Medicine~Progress Note    Name: Alondra Lange MRN: [id number]   : 1950 Hospital: . [place]    Date: 2021 12:00 PM Admission: 2021     Impression Plan   1. Acute hypoxic resp failure   2. COVID19 + PNA. Unvaccinated. Vit d ok. O + blood type   3. DM II  4. HTN  5. DNR 1. S/p Actemra on 8/15  2. Continue decadron for now  3. lovenox proph  4. Hold diuretics today   5. O2 titration above 90%  6. Prone and mobilize today       Daily Progression:    Down to 10lpm   Feeling ok  CRP 4.06  probnp 252    I have reviewed the labs and previous days notes. Pertinent items are noted in HPI. OBJECTIVE:     Vital Signs:       Visit Vitals  BP (!) 141/72 (BP 1 Location: Left upper arm, BP Patient Position: At rest)   Pulse 70   Temp 97.7 °F (36.5 °C)   Resp 18   Ht 6' (1.829 m)   Wt 94.1 kg (207 lb 8 oz)   SpO2 92%   BMI 28.14 kg/m²      Temp (24hrs), Av.9 °F (36.6 °C), Min:97.5 °F (36.4 °C), Max:98.8 °F (37.1 °C)     Intake/Output:     Last shift: No intake/output data recorded.     Last 3 shifts: 08/15 1901 -  0700  In: -   Out:  [Urine:1950]        No intake or output data in the 24 hours ending 21 1238    Physical Exam:                                        Exam Findings Other   General: No resp distress noted, appears stated age    [de-identified]:  No ulcers, JVD not elevated, no cervical LAD    Chest: No pectus deformity, normal chest rise b/l    HEART:  No visible thrills    Lungs:  Normal expansion     ABD: Soft/NT, non rigid mildly distended    EXT: No cyanosis/clubbing/edema, normal peripheral pulses    Skin: No rashes or ulcers, no mottling    Neuro: A/O x 3        Medications:  Current Facility-Administered Medications   Medication Dose Route Frequency    insulin lispro (HUMALOG) injection   SubCUTAneous AC&HS    dextrose (D50W) injection syrg 12.5-25 g  12.5-25 g IntraVENous PRN    albuterol (PROVENTIL HFA, VENTOLIN HFA, PROAIR HFA) inhaler 2 Puff  2 Puff Inhalation Q4H PRN    insulin glargine (LANTUS) injection 40 Units  40 Units SubCUTAneous DAILY    insulin lispro (HUMALOG) injection 6 Units  6 Units SubCUTAneous TIDAC    enoxaparin (LOVENOX) injection 30 mg  30 mg SubCUTAneous Q12H    acetaminophen (TYLENOL) tablet 650 mg  650 mg Oral Q6H PRN    Or    acetaminophen (TYLENOL) suppository 650 mg  650 mg Rectal Q6H PRN    dexamethasone (PF) (DECADRON) 10 mg/mL injection 6 mg  6 mg IntraVENous Q24H    cholecalciferol (VITAMIN D3) (1000 Units /25 mcg) tablet 2,000 Units  2,000 Units Oral DAILY    glucose chewable tablet 16 g  4 Tablet Oral PRN    dextrose (D50W) injection syrg 12.5-25 g  12.5-25 g IntraVENous PRN    glucagon (GLUCAGEN) injection 1 mg  1 mg IntraMUSCular PRN    ascorbic acid (vitamin C) (VITAMIN C) tablet 500 mg  500 mg Oral BID    zinc sulfate (ZINCATE) 50 mg zinc (220 mg) capsule 1 Capsule  1 Capsule Oral DAILY    sodium chloride (NS) flush 5-40 mL  5-40 mL IntraVENous Q8H    sodium chloride (NS) flush 5-40 mL  5-40 mL IntraVENous PRN    aspirin chewable tablet 81 mg  81 mg Oral DAILY    losartan (COZAAR) tablet 50 mg  50 mg Oral DAILY    atorvastatin (LIPITOR) tablet 20 mg  20 mg Oral DAILY       Labs:  ABG No results for input(s): PHI, PCO2I, PO2I, HCO3I, SO2I, FIO2I in the last 72 hours.      CBC Recent Labs     08/17/21  0455 08/16/21  0135 08/15/21  0743   WBC 8.9 9.0 6.5   HGB 16.8 14.9 15.8   HCT 52.3* 47.3 49.4    329 283   MCV 93.1 93.3 93.0   MCH 29.9 29.4 77.1        Metabolic  Panel Recent Labs     08/17/21 0455 08/16/21 0135 08/15/21  0743    134* 141  137   K 4.1 4.5 4.8  4.4    104 106  107   CO2 29 24 26  24   * 418* 308*  312*   BUN 38* 35* 33*  33*   CREA 1.49* 1.28 0.96  1.08   CA 9.3 9.3 9.0  9.3   MG 1.9 2.1  --    PHOS 4.8* 3.6  --    ALB  --   --  2.2*   ALT  --   --  29   INR  -- --  1.1        Pertinent Labs                Luda Chi PA-C  8/17/2021

## 2021-08-17 NOTE — PROGRESS NOTES
Michael 250 with patients wife to give her an update on the patients condition. Bedside shift change report given to Beatriz Birmingham (oncoming nurse) by Thomes Canavan (offgoing nurse). Report included the following information SBAR, Kardex, ED Summary, Intake/Output, MAR, Recent Results, Med Rec Status and Cardiac Rhythm NSR.

## 2021-08-17 NOTE — PROGRESS NOTES
[facility]  [facility]ist Group                                                                                          Hospitalist Progress Note  Mauri Juarez MD  Answering service: [phone] OR [phone] from in house phone        Date of Service:  2021  NAME:  Christa Pang  :  1950  MRN:  [id number]      Admission Summary: This is a 51-year-old  male with past medical history significant for type 2 diabetes mellitus, hypertension, and hypercholesterolemia, who presented to [facility] emergency department with low oxygen 84% at home and fever, 1-2 days. Interval history / Subjective:   Feels about the same. O2 requirement has been improving. No N/V/Abdominal pain      Assessment & Plan:     COVID 19  Acute hypoxic respiratory failure  - O2 to maintain saturations over 90%, currently on 10L  - Continue dexamethasone, VitC, Zinc, VitD  - Out of the window for Remdesivir per hospital policy   - Encouraged prone positioning, incentive spirometry   - Procalcitonin is 0.26 hold antibiotics for now   - Monitor inflammatory markers   - AC prophylaxis per protocol   - s/p Actemra 8/15  - pulmonary following  - Hold lasix today     Type 2 diabetes - A1c 10.7%  - SSI, POCT glucose checks and hypoglycemia protocol   - Increase insulin to 50U, add mealtime 6U   - Consult DTC    HTN - Continue losartan and monitor BP   HLD - statin     Code status: DNR, DDNR signed   DVT prophylaxis: lovenox     Care Plan discussed with: Patient/Family  Anticipated Disposition: Home w/Family  Anticipated Discharge: Greater than 48 hours, once O2 requirements decrease and stabilize.  May transfer to telemetry      Hospital Problems  Never Reviewed        Codes Class Noted POA    Pneumonia due to COVID-19 virus ICD-10-CM: U07.1, J12.82  ICD-9-CM: 480.8, 079.89  2021 Unknown                Review of Systems:   A comprehensive review of systems was negative except for that written in the HPI. Vital Signs:    Last 24hrs VS reviewed since prior progress note. Most recent are:  Visit Vitals  BP (!) 145/76 (BP 1 Location: Left upper arm, BP Patient Position: At rest)   Pulse 67   Temp 98 °F (36.7 °C)   Resp 22   Ht 6' (1.829 m)   Wt 94.1 kg (207 lb 8 oz)   SpO2 90%   BMI 28.14 kg/m²         Intake/Output Summary (Last 24 hours) at 8/17/2021 1515  Last data filed at 8/17/2021 0778  Gross per 24 hour   Intake    Output 250 ml   Net -250 ml        Physical Examination:     I had a face to face encounter with this patient and independently examined them on 8/17/2021 as outlined below:          Constitutional:  Mild resp distress, cooperative, pleasant    ENT:  Oral mucosa moist, oropharynx benign. Resp:  Crackles bilaterally. No wheezing/rhonchi/rales. Tachypnea. Hypoxic with low 90's on 15L    CV:  Regular rhythm, normal rate, no murmurs, gallops, rubs    GI:  Soft, non distended, non tender. normoactive bowel sounds, no hepatosplenomegaly     Musculoskeletal:  No edema, warm, 2+ pulses throughout    Neurologic:  Moves all extremities.   AAOx3, CN II-XII reviewed            Data Review:    Review and/or order of clinical lab test  Review and/or order of tests in the radiology section of CPT  Review and/or order of tests in the medicine section of CPT      Labs:     Recent Labs     08/17/21 0455 08/16/21  0135   WBC 8.9 9.0   HGB 16.8 14.9   HCT 52.3* 47.3    329     Recent Labs     08/17/21  0455 08/16/21  0135 08/15/21  0743    134* 141  137   K 4.1 4.5 4.8  4.4    104 106  107   CO2 29 24 26  24   BUN 38* 35* 33*  33*   CREA 1.49* 1.28 0.96  1.08   * 418* 308*  312*   CA 9.3 9.3 9.0  9.3   MG 1.9 2.1  --    PHOS 4.8* 3.6  --      Recent Labs     08/15/21  0743   ALT 29   AP 89   TBILI 0.6   TP 7.4   ALB 2.2*   GLOB 5.2*     Recent Labs     08/15/21  0743   INR 1.1   PTP 11.0      Recent Labs     08/15/21  0743   FERR 1,723*      No results found for: FOL, RBCF   No results for input(s): PH, PCO2, PO2 in the last 72 hours.   Recent Labs     08/15/21  0743   TROIQ <0.05     No results found for: CHOL, CHOLX, CHLST, CHOLV, HDL, HDLP, LDL, LDLC, DLDLP, TGLX, TRIGL, TRIGP, CHHD, CHHDX  Lab Results   Component Value Date/Time    Glucose (POC) 318 (H) 08/17/2021 11:49 AM    Glucose (POC) 337 (H) 08/17/2021 06:09 AM    Glucose (POC) 431 (H) 08/16/2021 10:22 PM    Glucose (POC) 375 (H) 08/16/2021 04:03 PM    Glucose (POC) 278 (H) 08/16/2021 11:23 AM     No results found for: COLOR, APPRN, SPGRU, REFSG, KYLER, PROTU, GLUCU, KETU, BILU, UROU, AQUILES, LEUKU, GLUKE, EPSU, BACTU, WBCU, RBCU, CASTS, UCRY      Medications Reviewed:     Current Facility-Administered Medications   Medication Dose Route Frequency    insulin lispro (HUMALOG) injection   SubCUTAneous AC&HS    dextrose (D50W) injection syrg 12.5-25 g  12.5-25 g IntraVENous PRN    albuterol (PROVENTIL HFA, VENTOLIN HFA, PROAIR HFA) inhaler 2 Puff  2 Puff Inhalation Q4H PRN    insulin glargine (LANTUS) injection 40 Units  40 Units SubCUTAneous DAILY    insulin lispro (HUMALOG) injection 6 Units  6 Units SubCUTAneous TIDAC    enoxaparin (LOVENOX) injection 30 mg  30 mg SubCUTAneous Q12H    acetaminophen (TYLENOL) tablet 650 mg  650 mg Oral Q6H PRN    Or    acetaminophen (TYLENOL) suppository 650 mg  650 mg Rectal Q6H PRN    dexamethasone (PF) (DECADRON) 10 mg/mL injection 6 mg  6 mg IntraVENous Q24H    cholecalciferol (VITAMIN D3) (1000 Units /25 mcg) tablet 2,000 Units  2,000 Units Oral DAILY    glucose chewable tablet 16 g  4 Tablet Oral PRN    dextrose (D50W) injection syrg 12.5-25 g  12.5-25 g IntraVENous PRN    glucagon (GLUCAGEN) injection 1 mg  1 mg IntraMUSCular PRN    ascorbic acid (vitamin C) (VITAMIN C) tablet 500 mg  500 mg Oral BID    zinc sulfate (ZINCATE) 50 mg zinc (220 mg) capsule 1 Capsule  1 Capsule Oral DAILY    sodium chloride (NS) flush 5-40 mL  5-40 mL IntraVENous Q8H    sodium chloride (NS) flush 5-40 mL  5-40 mL IntraVENous PRN    aspirin chewable tablet 81 mg  81 mg Oral DAILY    losartan (COZAAR) tablet 50 mg  50 mg Oral DAILY    atorvastatin (LIPITOR) tablet 20 mg  20 mg Oral DAILY     ______________________________________________________________________  EXPECTED LENGTH OF STAY: 5d 9h  ACTUAL LENGTH OF STAY:          3                 Julio César Young MD

## 2021-08-17 NOTE — DIABETES MGMT
[address] [facility]    CLINICAL NURSE SPECIALIST CONSULT     INITIAL NOTE    Initial Presentation   Kenton Magaña is a 70 y.o. male admitted s/p + COVID -19 diagnosis last week presented  with low oxygenation/ SOB and fevers for 1-2 days. Not vaccinated for COVID-19. HX: No past medical history on file. 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 C/ Zinc    Consulted by Provider for advanced diabetes nursing assessment and care for:   [] Transitioning off Hossein Bunting   [x] Inpatient management strategy  [] Home management assessment  [] Survival skill education    Hospital Course   Clinical progress has been complicated by acute hypoxic respiratory failure in setting of COVID-19 pna. Other complicating factors include uncontrolled DM2 at admission. Diabetes History   Patient  with Type 2 diabetes, now of unknown years duration. PTA  treated with Metformin and Glimepiride. . Ambulatory blood glucose management provided by primary care provider- Dr. Kortney Hernandez- last e-visit 3/2021. Patient refused adding a 3rd antihyperglycemic (SGLT-2) at this appt. Latest A1c of 10.7%  indicates poor diabetes control. Previous A1Cs  8.8% Dec. 2020. Admission .     Diabetes-related Medical History  Acute complications  Steroid induced hyperglycemia   Neurological complications  Impotence  Microvascular disease  nephropathy  Macrovascular disease  Cerebral vascular accident - noted by PCP at 3/2021 visit  Other associated conditions     HTN/ hypercholesteremia    Diabetes Medication History  Drug class Currently in use Discontinued Never used   Biguanide Metformin XR 500mg BID     DDP-4 inhibitor       Sulfonylurea Glimepiride 4mg daily-  Glyburide    Thiazolidinedione      GLP-1 RA      SGLT-2 inhibitors Basal insulin      Bolus insulin      Fixed Dose  Combinations        Subjective   Patient currently COVID-19+ and under strict isolation precautions. Did not have face to face interview. Attempted to reach patient by phone but unsuccessful    On 15L hi flow NC 02    Patient reports the following home diabetes self-management practices: deferred  Eating pattern     Physical activity pattern     Monitoring pattern     Taking medications pattern        Overall evaluation:    [x] NOT Achieving A1c target with drug therapy & self-care practices     Objective   Physical exam- NOT performed due to COVID-19 isolation precautions  General   Neuro    Vital Signs   Visit Vitals  BP (!) 142/67 (BP 1 Location: Left arm, BP Patient Position: At rest)   Pulse 79   Temp 98.8 °F (37.1 °C)   Resp 19   Ht 6' (1.829 m)   Wt 94.1 kg (207 lb 8 oz)   SpO2 91%   BMI 28.14 kg/m²         Diabetic foot exam:  Vibratory and Filament test: deferred due to clinical condition   .      Laboratory  BMP:   Lab Results   Component Value Date/Time     08/17/2021 04:55 AM    K 4.1 08/17/2021 04:55 AM     08/17/2021 04:55 AM    CO2 29 08/17/2021 04:55 AM    AGAP 7 08/17/2021 04:55 AM     (H) 08/17/2021 04:55 AM    BUN 38 (H) 08/17/2021 04:55 AM    CREA 1.49 (H) 08/17/2021 04:55 AM    GFRAA 56 (L) 08/17/2021 04:55 AM    GFRNA 46 (L) 08/17/2021 04:55 AM      Factors impacting BG management  Factor Dose Comments   Nutrition:  Standard meals     60 grams/meal      Drugs:    Steroids       Decadron 6mg daily       Impairs insulin action    Decadron insulin dosing    >8mg dexamethosone = 0.4units/kg   6mg   dexamethosone = 0.3units/kg   4mg   dexamethosone = 0.2units/kg   2mg   dexamethosone = 0.1units/kg    Pain     Infection COVID-19 pna  C reactive 4.06 (improved from prior )    Other: CKD GFR 46, Cr+ 1.49      Blood glucose pattern        Assessment and Plan   Nursing Diagnosis Risk for unstable blood glucose pattern   Nursing Intervention Domain 3415 Decision-making Support   Nursing Interventions Examined current inpatient diabetes control   Explored factors facilitating and impeding inpatient management  Identified self-management practices impeding diabetes control  Explored corrective strategies with patient and responsible inpatient provider   Informed patient of rational for insulin strategy while hospitalized     Evaluation   This  male , with Type 2 diabetes, did notachieve diabetes control prior to admission, as evidenced by A1c of 10.7%. Currently admitted for COVID-19 pna in setting of uncontrolled DM2. Requiring steroid therapy and hi flow02 to maintain oxygenation. Re: DM2 recent management, information gleaned from a chart review. He last had e-visit with PCP in 3/2021, and PCP recommended adding another antihyperglycemic med to his regimen but patient wanted to wait until May 2021 visit. Never seen in May 2021 per chart review. He was taking Metformin and Glimepiride daily. BG trends since admission consistent with steroid induced hyperglycemia assoc. With daily Decadron. Noted basal insulin dose increased today from 20 to 40 units daily. During this hospitalization, the patient has not achieved inpatient blood glucose target of 100-180mg/dl. Several factors have played a role in blood glucose management including:  [x] Critical nature of illness state  []  Changing nutritive sources & needs   [x] Compromised insulin absorption or delivery  [x] Glucocorticoid use  []  Kidney dysfunction  []  Liver disease    The Subcutaneous Insulin Order set (5684) has  been in use. Hence, the next step in optimizing blood glucose control would be    [x] Implement the Subcutaneous Insulin order set  [x]  Optimize basal insulin dosing   []  Add mealtime insulin    Blood glucose management would be improved by  [x]  Adding insulin to override impact of steroids    Recommendations   1.  IF FBG in am >200, recommend adjusting basal insulin per recs below. [] Use of Subcutaneous Insulin Order set (8000)  Insulin Dosing Specific recommendation   ADJUST Basal                                      (Based on weight, BMI & GFR) 50units daily-Lantus    0.2u/kg dosing for DM2= 20units  0.3u/kg dosing for steroid=30 units  TDD 50units    CONTINUE pre meal Nutritional                                      (Based on CHO/dextrose load) [x] Normal sensitivity  6units Humalog TID    MODIFIED Corrective                                       (Useful in adjusting insulin dosing)   [x] Insulin-resistant sensitivity- on steroids      2. Modified diet to include 60gm carb consistent  Billing Code(s)   90127      Before making these care recommendations, I personally reviewed the hospitalization record, including notes, laboratory & diagnostic data and current medications, and examined the patient at the bedside (circumstances permitting) before making care recommendations.      Total minutes: [address] [facility] MICHELLE Kim  Diabetes Clinical Nurse Specialist  [facility]  Access via [address] [place] [address]

## 2021-08-18 LAB
ANION GAP SERPL CALC-SCNC: 10 MMOL/L (ref 5–15)
BASOPHILS # BLD: 0 K/UL (ref 0–0.1)
BASOPHILS NFR BLD: 0 % (ref 0–1)
BNP SERPL-MCNC: 196 PG/ML
BUN SERPL-MCNC: 34 MG/DL (ref 6–20)
BUN/CREAT SERPL: 27 (ref 12–20)
CALCIUM SERPL-MCNC: 9.7 MG/DL (ref 8.5–10.1)
CHLORIDE SERPL-SCNC: 104 MMOL/L (ref 97–108)
CO2 SERPL-SCNC: 23 MMOL/L (ref 21–32)
CREAT SERPL-MCNC: 1.24 MG/DL (ref 0.7–1.3)
CRP SERPL-MCNC: 2.11 MG/DL (ref 0–0.6)
DIFFERENTIAL METHOD BLD: ABNORMAL
EOSINOPHIL # BLD: 0 K/UL (ref 0–0.4)
EOSINOPHIL NFR BLD: 0 % (ref 0–7)
ERYTHROCYTE [DISTWIDTH] IN BLOOD BY AUTOMATED COUNT: 11.9 % (ref 11.5–14.5)
GLUCOSE BLD STRIP.AUTO-MCNC: 215 MG/DL (ref 65–117)
GLUCOSE BLD STRIP.AUTO-MCNC: 242 MG/DL (ref 65–117)
GLUCOSE BLD STRIP.AUTO-MCNC: 269 MG/DL (ref 65–117)
GLUCOSE BLD STRIP.AUTO-MCNC: 309 MG/DL (ref 65–117)
GLUCOSE SERPL-MCNC: 266 MG/DL (ref 65–100)
HCT VFR BLD AUTO: 53.4 % (ref 36.6–50.3)
HGB BLD-MCNC: 17.1 G/DL (ref 12.1–17)
IMM GRANULOCYTES # BLD AUTO: 0.1 K/UL (ref 0–0.04)
IMM GRANULOCYTES NFR BLD AUTO: 1 % (ref 0–0.5)
LYMPHOCYTES # BLD: 1.7 K/UL (ref 0.8–3.5)
LYMPHOCYTES NFR BLD: 18 % (ref 12–49)
MAGNESIUM SERPL-MCNC: 2.1 MG/DL (ref 1.6–2.4)
MCH RBC QN AUTO: 29.7 PG (ref 26–34)
MCHC RBC AUTO-ENTMCNC: 32 G/DL (ref 30–36.5)
MCV RBC AUTO: 92.9 FL (ref 80–99)
MONOCYTES # BLD: 0.9 K/UL (ref 0–1)
MONOCYTES NFR BLD: 9 % (ref 5–13)
NEUTS SEG # BLD: 6.9 K/UL (ref 1.8–8)
NEUTS SEG NFR BLD: 72 % (ref 32–75)
NRBC # BLD: 0 K/UL (ref 0–0.01)
NRBC BLD-RTO: 0 PER 100 WBC
PHOSPHATE SERPL-MCNC: 4 MG/DL (ref 2.6–4.7)
PLATELET # BLD AUTO: 373 K/UL (ref 150–400)
PMV BLD AUTO: 12.4 FL (ref 8.9–12.9)
POTASSIUM SERPL-SCNC: 5.2 MMOL/L (ref 3.5–5.1)
RBC # BLD AUTO: 5.75 M/UL (ref 4.1–5.7)
SERVICE CMNT-IMP: ABNORMAL
SODIUM SERPL-SCNC: 137 MMOL/L (ref 136–145)
WBC # BLD AUTO: 9.6 K/UL (ref 4.1–11.1)

## 2021-08-18 PROCEDURE — 36415 COLL VENOUS BLD VENIPUNCTURE: CPT

## 2021-08-18 PROCEDURE — 82962 GLUCOSE BLOOD TEST: CPT

## 2021-08-18 PROCEDURE — 84100 ASSAY OF PHOSPHORUS: CPT

## 2021-08-18 PROCEDURE — 83880 ASSAY OF NATRIURETIC PEPTIDE: CPT

## 2021-08-18 PROCEDURE — 74011250636 HC RX REV CODE- 250/636: Performed by: HOSPITALIST

## 2021-08-18 PROCEDURE — 83735 ASSAY OF MAGNESIUM: CPT

## 2021-08-18 PROCEDURE — 65660000000 HC RM CCU STEPDOWN

## 2021-08-18 PROCEDURE — 80048 BASIC METABOLIC PNL TOTAL CA: CPT

## 2021-08-18 PROCEDURE — 85025 COMPLETE CBC W/AUTO DIFF WBC: CPT

## 2021-08-18 PROCEDURE — 86140 C-REACTIVE PROTEIN: CPT

## 2021-08-18 PROCEDURE — 74011250637 HC RX REV CODE- 250/637: Performed by: HOSPITALIST

## 2021-08-18 PROCEDURE — 74011250637 HC RX REV CODE- 250/637: Performed by: NURSE PRACTITIONER

## 2021-08-18 PROCEDURE — 74011636637 HC RX REV CODE- 636/637: Performed by: INTERNAL MEDICINE

## 2021-08-18 RX ORDER — INSULIN LISPRO 100 [IU]/ML
10 INJECTION, SOLUTION INTRAVENOUS; SUBCUTANEOUS
Status: DISCONTINUED | OUTPATIENT
Start: 2021-08-18 | End: 2021-08-20

## 2021-08-18 RX ORDER — LANOLIN ALCOHOL/MO/W.PET/CERES
3 CREAM (GRAM) TOPICAL
Status: DISCONTINUED | OUTPATIENT
Start: 2021-08-18 | End: 2021-08-22 | Stop reason: HOSPADM

## 2021-08-18 RX ADMIN — ATORVASTATIN CALCIUM 20 MG: 20 TABLET, FILM COATED ORAL at 09:38

## 2021-08-18 RX ADMIN — INSULIN LISPRO 4 UNITS: 100 INJECTION, SOLUTION INTRAVENOUS; SUBCUTANEOUS at 07:08

## 2021-08-18 RX ADMIN — INSULIN LISPRO 7 UNITS: 100 INJECTION, SOLUTION INTRAVENOUS; SUBCUTANEOUS at 16:30

## 2021-08-18 RX ADMIN — LOSARTAN POTASSIUM 50 MG: 50 TABLET, FILM COATED ORAL at 09:38

## 2021-08-18 RX ADMIN — INSULIN GLARGINE 50 UNITS: 100 INJECTION, SOLUTION SUBCUTANEOUS at 09:38

## 2021-08-18 RX ADMIN — ZINC SULFATE 220 MG (50 MG) CAPSULE 1 CAPSULE: CAPSULE at 09:38

## 2021-08-18 RX ADMIN — ASPIRIN 81 MG: 81 TABLET, CHEWABLE ORAL at 09:38

## 2021-08-18 RX ADMIN — INSULIN LISPRO 4 UNITS: 100 INJECTION, SOLUTION INTRAVENOUS; SUBCUTANEOUS at 13:05

## 2021-08-18 RX ADMIN — OXYCODONE HYDROCHLORIDE AND ACETAMINOPHEN 500 MG: 500 TABLET ORAL at 09:38

## 2021-08-18 RX ADMIN — Medication 3 MG: at 23:05

## 2021-08-18 RX ADMIN — INSULIN LISPRO 10 UNITS: 100 INJECTION, SOLUTION INTRAVENOUS; SUBCUTANEOUS at 16:30

## 2021-08-18 RX ADMIN — ENOXAPARIN SODIUM 30 MG: 40 INJECTION SUBCUTANEOUS at 07:08

## 2021-08-18 RX ADMIN — INSULIN LISPRO 6 UNITS: 100 INJECTION, SOLUTION INTRAVENOUS; SUBCUTANEOUS at 07:07

## 2021-08-18 RX ADMIN — ENOXAPARIN SODIUM 30 MG: 40 INJECTION SUBCUTANEOUS at 17:26

## 2021-08-18 RX ADMIN — INSULIN LISPRO 5 UNITS: 100 INJECTION, SOLUTION INTRAVENOUS; SUBCUTANEOUS at 23:05

## 2021-08-18 RX ADMIN — DEXAMETHASONE SODIUM PHOSPHATE 6 MG: 10 INJECTION INTRAMUSCULAR; INTRAVENOUS at 13:05

## 2021-08-18 RX ADMIN — OXYCODONE HYDROCHLORIDE AND ACETAMINOPHEN 500 MG: 500 TABLET ORAL at 17:26

## 2021-08-18 RX ADMIN — Medication 5 ML: at 14:00

## 2021-08-18 RX ADMIN — INSULIN LISPRO 6 UNITS: 100 INJECTION, SOLUTION INTRAVENOUS; SUBCUTANEOUS at 13:05

## 2021-08-18 RX ADMIN — Medication 2000 UNITS: at 09:38

## 2021-08-18 NOTE — PROGRESS NOTES
Bedside shift change report given to Eleanor Kaufman (oncoming nurse) by Nigel Raman (offgoing nurse). Report included the following information SBAR, Kardex, ED Summary, Procedure Summary, Intake/Output, MAR, Recent Results, Med Rec Status and Cardiac Rhythm NSR.

## 2021-08-18 NOTE — PROGRESS NOTES
[address] [facility]  Hospitalist Group                                                                                          Hospitalist Progress Note  Julio César Young MD  Answering service: [phone] from in house phone        Date of Service:  2021  NAME:  Dewayne Cordova  :  1950  MRN:  [id number]      Admission Summary: This is a 70-year-old  male with past medical history significant for type 2 diabetes mellitus, hypertension, and hypercholesterolemia, who presented to [facility] emergency department with low oxygen 84% at home and fever, 1-2 days. Interval history / Subjective:   Breathing is stable, remains on 10L. He feels about the same as yesterday. Assessment & Plan:     COVID 19  Acute hypoxic respiratory failure  - O2 to maintain saturations over 90%, currently on 10L  - Continue dexamethasone, VitC, Zinc, VitD  - Out of the window for Remdesivir per hospital policy   - Encouraged prone positioning, incentive spirometry   - Procalcitonin is 0.26 hold antibiotics for now   - Monitor inflammatory markers   - AC prophylaxis per protocol   - s/p Actemra 8/15  - pulmonary following  - Hold lasix today, consider tomorrow if Cr stable. Type 2 diabetes - A1c 10.7%  - SSI, POCT glucose checks and hypoglycemia protocol   - continue insulin glargine 50U, increase mealtime to 10U    - DTC followiing  - Will need insulin on DC. HTN - Continue losartan and monitor BP   HLD - statin     Code status: DNR, DDNR signed   DVT prophylaxis: lovenox     Care Plan discussed with: Patient/Family  Anticipated Disposition: Home w/Family  Anticipated Discharge: Greater than 48 hours, once O2 requirements decrease and stabilize.  May transfer to telemetry      Hospital Problems  Never Reviewed        Codes Class Noted POA    Pneumonia due to COVID-19 virus ICD-10-CM: U07.1, J12.82  ICD-9-CM: 480.8, 079.89  2021 Unknown                Review of Systems:   A comprehensive review of systems was negative except for that written in the HPI. Vital Signs:    Last 24hrs VS reviewed since prior progress note. Most recent are:  Visit Vitals  BP (!) 144/82   Pulse 67   Temp 97.6 °F (36.4 °C)   Resp 25   Ht 6' (1.829 m)   Wt 94.1 kg (207 lb 8 oz)   SpO2 91%   BMI 28.14 kg/m²         Intake/Output Summary (Last 24 hours) at 8/18/2021 1543  Last data filed at 8/18/2021 9788  Gross per 24 hour   Intake    Output 1200 ml   Net -1200 ml        Physical Examination:     I had a face to face encounter with this patient and independently examined them on 8/18/2021 as outlined below:          Constitutional:  Mild resp distress, cooperative, pleasant    ENT:  Oral mucosa moist, oropharynx benign. Resp:  Crackles bilaterally. No wheezing/rhonchi/rales. Tachypnea. Hypoxic with low 90's on 15L    CV:  Regular rhythm, normal rate, no murmurs, gallops, rubs    GI:  Soft, non distended, non tender. normoactive bowel sounds, no hepatosplenomegaly     Musculoskeletal:  No edema, warm, 2+ pulses throughout    Neurologic:  Moves all extremities. AAOx3, CN II-XII reviewed            Data Review:    Review and/or order of clinical lab test  Review and/or order of tests in the radiology section of CPT  Review and/or order of tests in the medicine section of CPT      Labs:     Recent Labs     08/18/21 0418 08/17/21  0455   WBC 9.6 8.9   HGB 17.1* 16.8   HCT 53.4* 52.3*    348     Recent Labs     08/18/21  0418 08/17/21  0455 08/16/21  0135    138 134*   K 5.2* 4.1 4.5    102 104   CO2 23 29 24   BUN 34* 38* 35*   CREA 1.24 1.49* 1.28   * 362* 418*   CA 9.7 9.3 9.3   MG 2.1 1.9 2.1   PHOS 4.0 4.8* 3.6     No results for input(s): ALT, AP, TBIL, TBILI, TP, ALB, GLOB, GGT, AML, LPSE in the last 72 hours. No lab exists for component: SGOT, GPT, AMYP, HLPSE  No results for input(s): INR, PTP, APTT, INREXT, INREXT in the last 72 hours.    No results for input(s): FE, TIBC, PSAT, FERR in the last 72 hours. No results found for: FOL, RBCF   No results for input(s): PH, PCO2, PO2 in the last 72 hours. No results for input(s): CPK, CKNDX, TROIQ in the last 72 hours.     No lab exists for component: CPKMB  No results found for: CHOL, CHOLX, CHLST, CHOLV, HDL, HDLP, LDL, LDLC, DLDLP, TGLX, TRIGL, TRIGP, CHHD, CHHDX  Lab Results   Component Value Date/Time    Glucose (POC) 215 (H) 08/18/2021 11:41 AM    Glucose (POC) 242 (H) 08/18/2021 07:06 AM    Glucose (POC) 348 (H) 08/17/2021 10:42 PM    Glucose (POC) 457 (H) 08/17/2021 04:47 PM    Glucose (POC) 318 (H) 08/17/2021 11:49 AM     No results found for: COLOR, APPRN, SPGRU, REFSG, KYLER, PROTU, GLUCU, KETU, BILU, UROU, AQUILES, LEUKU, GLUKE, EPSU, BACTU, WBCU, RBCU, CASTS, UCRY      Medications Reviewed:     Current Facility-Administered Medications   Medication Dose Route Frequency    insulin lispro (HUMALOG) injection   SubCUTAneous AC&HS    dextrose (D50W) injection syrg 12.5-25 g  12.5-25 g IntraVENous PRN    albuterol (PROVENTIL HFA, VENTOLIN HFA, PROAIR HFA) inhaler 2 Puff  2 Puff Inhalation Q4H PRN    insulin glargine (LANTUS) injection 50 Units  50 Units SubCUTAneous DAILY    insulin lispro (HUMALOG) injection 6 Units  6 Units SubCUTAneous TIDAC    enoxaparin (LOVENOX) injection 30 mg  30 mg SubCUTAneous Q12H    acetaminophen (TYLENOL) tablet 650 mg  650 mg Oral Q6H PRN    Or    acetaminophen (TYLENOL) suppository 650 mg  650 mg Rectal Q6H PRN    dexamethasone (PF) (DECADRON) 10 mg/mL injection 6 mg  6 mg IntraVENous Q24H    cholecalciferol (VITAMIN D3) (1000 Units /25 mcg) tablet 2,000 Units  2,000 Units Oral DAILY    glucose chewable tablet 16 g  4 Tablet Oral PRN    dextrose (D50W) injection syrg 12.5-25 g  12.5-25 g IntraVENous PRN    glucagon (GLUCAGEN) injection 1 mg  1 mg IntraMUSCular PRN    ascorbic acid (vitamin C) (VITAMIN C) tablet 500 mg  500 mg Oral BID    zinc sulfate (ZINCATE) 50 mg zinc (220 mg) capsule 1 Capsule  1 Capsule Oral DAILY    sodium chloride (NS) flush 5-40 mL  5-40 mL IntraVENous Q8H    sodium chloride (NS) flush 5-40 mL  5-40 mL IntraVENous PRN    aspirin chewable tablet 81 mg  81 mg Oral DAILY    losartan (COZAAR) tablet 50 mg  50 mg Oral DAILY    atorvastatin (LIPITOR) tablet 20 mg  20 mg Oral DAILY     ______________________________________________________________________  EXPECTED LENGTH OF STAY: 5d 9h  ACTUAL LENGTH OF STAY:          4                 Tarsha Rivera MD

## 2021-08-18 NOTE — ROUTINE PROCESS
Bedside and Verbal shift change report given to Fortino Ortega (oncoming nurse) by Rhonda Gao (offgoing nurse).  Report included the following information SBAR, Kardex, Intake/Output, MAR and Cardiac Rhythm SR.

## 2021-08-18 NOTE — DIABETES MGMT
[address] [facility]    CLINICAL NURSE SPECIALIST CONSULT     FOLLOW UP NOTE    Initial Presentation   Gladis Bonilla is a 70 y.o. male admitted s/p + COVID -19 diagnosis last week presented  with low oxygenation/ SOB and fevers for 1-2 days. Not vaccinated for COVID-19. HX: No past medical history on file. 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 C/ Zinc    Consulted by Provider for advanced diabetes nursing assessment and care for:   [] Transitioning off Tsosie Modest   [x] Inpatient management strategy  [] Home management assessment  [] Survival skill education    Hospital Course   Clinical progress has been complicated by acute hypoxic respiratory failure in setting of COVID-19 pna. Other complicating factors include uncontrolled DM2 at admission. Diabetes History   Patient  with Type 2 diabetes, now of unknown years duration. PTA  treated with Metformin and Glimepiride. . Ambulatory blood glucose management provided by primary care provider- Dr. Rosa Amanda- last e-visit 3/2021. Patient refused adding a 3rd antihyperglycemic (SGLT-2) at this appt. Latest A1c of 10.7%  indicates poor diabetes control. Previous A1Cs  8.8% Dec. 2020. Admission .     Diabetes-related Medical History  Acute complications  Steroid induced hyperglycemia   Neurological complications  Impotence  Microvascular disease  nephropathy  Macrovascular disease  Cerebral vascular accident - noted by PCP at 3/2021 visit  Other associated conditions     HTN/ hypercholesteremia    Diabetes Medication History  Drug class Currently in use Discontinued Never used   Biguanide Metformin XR 500mg BID     DDP-4 inhibitor       Sulfonylurea Glimepiride 4mg daily-  Glyburide    Thiazolidinedione      GLP-1 RA      SGLT-2 inhibitors Basal insulin      Bolus insulin      Fixed Dose  Combinations        Subjective   Spoke with patient over the phone today - Voice raspy, and a little weak . On 15L hi flow NC 02    Patient reports the following home diabetes self-management practices: deferred  Eating pattern-proteins/ not many veggies/ potatoes  Snacks- candy or chocolate/ apple slices     Physical activity pattern- ambulates without assistance,      Monitoring pattern-does not check sugars     Taking medications pattern- recently inconsistent? Overall evaluation:    [x] NOT Achieving A1c target with drug therapy & self-care practices     Objective   Physical exam-  General-KIERSTEN   Neuro - alert/ oriented on phone, conversant    Vital Signs   Visit Vitals  BP (!) 144/82   Pulse 65   Temp 97.6 °F (36.4 °C)   Resp 25   Ht 6' (1.829 m)   Wt 94.1 kg (207 lb 8 oz)   SpO2 91%   BMI 28.14 kg/m²         Diabetic foot exam:  Vibratory and Filament test: deferred due to clinical condition   .      Laboratory  BMP:   Lab Results   Component Value Date/Time     08/18/2021 04:18 AM    K 5.2 (H) 08/18/2021 04:18 AM     08/18/2021 04:18 AM    CO2 23 08/18/2021 04:18 AM    AGAP 10 08/18/2021 04:18 AM     (H) 08/18/2021 04:18 AM    BUN 34 (H) 08/18/2021 04:18 AM    CREA 1.24 08/18/2021 04:18 AM    GFRAA >60 08/18/2021 04:18 AM    GFRNA 57 (L) 08/18/2021 04:18 AM      Factors impacting BG management  Factor Dose Comments   Nutrition:  Standard meals     60 grams/meal   \" I'm eating like a horse\"   Drugs:    Steroids       Decadron 6mg daily       Impairs insulin action    Decadron insulin dosing    >8mg dexamethosone = 0.4units/kg   6mg   dexamethosone = 0.3units/kg   4mg   dexamethosone = 0.2units/kg   2mg   dexamethosone = 0.1units/kg    Pain     Infection COVID-19 pna  C reactive 4.06 (improved from prior )    Other: CKD GFR 46, Cr+ 1.49      Blood glucose pattern        Assessment and Plan   Nursing Diagnosis Risk for unstable blood glucose pattern   Nursing Intervention Domain 5259 Decision-making Support   Nursing Interventions Examined current inpatient diabetes control   Explored factors facilitating and impeding inpatient management  Identified self-management practices impeding diabetes control  Explored corrective strategies with patient and responsible inpatient provider   Informed patient of rational for insulin strategy while hospitalized     Evaluation   This  male , with Type 2 diabetes, did notachieve diabetes control prior to admission, as evidenced by A1c of 10.7%. Currently admitted for COVID-19 pna in setting of uncontrolled DM2. Requiring steroid therapy and hi flow02 to maintain oxygenation. Re: DM2 recent management, information gleaned from a chart review. He last had e-visit with PCP in 3/2021, and PCP recommended adding another antihyperglycemic med to his regimen but patient wanted to wait until May 2021 visit. Never seen in May 2021 per chart review. He was taking Metformin and Glimepiride daily. BG trends since admission consistent with steroid induced hyperglycemia assoc. With daily Decadron. Noted basal insulin dose increased today from 20 to 40 units daily. During this hospitalization, the patient has not achieved inpatient blood glucose target of 100-180mg/dl. Several factors have played a role in blood glucose management including:  [x] Critical nature of illness state  []  Changing nutritive sources & needs   [x] Compromised insulin absorption or delivery  [x] Glucocorticoid use  []  Kidney dysfunction  []  Liver disease    The Subcutaneous Insulin Order set (9444) has  been in use.  Hence, the next step in optimizing blood glucose control would be    [x] Implement the Subcutaneous Insulin order set  [x]  Optimize basal insulin dosing   []  Add mealtime insulin    Blood glucose management would be improved by  [x]  Adding insulin to override impact of steroids      BG trends improved overnight-  today; required 24units of correctional insulin on 8/17/21  Tolerating diet at 100%- \"i'm eating like a horse'-   Continues on steroid regimen  Recommendations   1. IF FBG in am >200, recommend adjusting basal insulin per recs below. [] Use of Subcutaneous Insulin Order set (2616)  Insulin Dosing Specific recommendation   CONTINUE  Basal                                      (Based on weight, BMI & GFR) 50units daily-Lantus    0.2u/kg dosing for DM2= 20units  0.3u/kg dosing for steroid=30 units  TDD 50units    ADJUST  pre meal Nutritional                                      (Based on CHO/dextrose load) [x] Normal sensitivity  10 units Humalog TID    MODIFIED Corrective                                       (Useful in adjusting insulin dosing)   [x] Insulin-resistant sensitivity- on steroids      2. Modified diet to include 60gm carb consistent  Billing Code(s)   [id number]      Before making these care recommendations, I personally reviewed the hospitalization record, including notes, laboratory & diagnostic data and current medications, and examined the patient at the bedside (circumstances permitting) before making care recommendations.      Total minutes: 509 OPAL Woo, CNS  Diabetes Clinical Nurse Specialist  [facility]  Access via [address] [place] [address]

## 2021-08-18 NOTE — PROGRESS NOTES
PCCM:    Still on midflow but wean down    CRP trending down    Plan:  COVID19   Continue to wean O2   Complete decadron   Follow up as telemedicine in 7 days    We will be available again to see if needed    Meche Solano PA-C

## 2021-08-19 LAB
ANION GAP SERPL CALC-SCNC: 5 MMOL/L (ref 5–15)
BASOPHILS # BLD: 0.1 K/UL (ref 0–0.1)
BASOPHILS NFR BLD: 0 % (ref 0–1)
BNP SERPL-MCNC: 151 PG/ML
BUN SERPL-MCNC: 37 MG/DL (ref 6–20)
BUN/CREAT SERPL: 34 (ref 12–20)
CALCIUM SERPL-MCNC: 9.9 MG/DL (ref 8.5–10.1)
CHLORIDE SERPL-SCNC: 105 MMOL/L (ref 97–108)
CO2 SERPL-SCNC: 27 MMOL/L (ref 21–32)
CREAT SERPL-MCNC: 1.09 MG/DL (ref 0.7–1.3)
CRP SERPL-MCNC: 1.24 MG/DL (ref 0–0.6)
D DIMER PPP FEU-MCNC: 0.91 MG/L FEU (ref 0–0.65)
DIFFERENTIAL METHOD BLD: ABNORMAL
EOSINOPHIL # BLD: 0.1 K/UL (ref 0–0.4)
EOSINOPHIL NFR BLD: 1 % (ref 0–7)
ERYTHROCYTE [DISTWIDTH] IN BLOOD BY AUTOMATED COUNT: 11.9 % (ref 11.5–14.5)
GLUCOSE BLD STRIP.AUTO-MCNC: 183 MG/DL (ref 65–117)
GLUCOSE BLD STRIP.AUTO-MCNC: 187 MG/DL (ref 65–117)
GLUCOSE BLD STRIP.AUTO-MCNC: 253 MG/DL (ref 65–117)
GLUCOSE BLD STRIP.AUTO-MCNC: 285 MG/DL (ref 65–117)
GLUCOSE SERPL-MCNC: 197 MG/DL (ref 65–100)
HCT VFR BLD AUTO: 55.3 % (ref 36.6–50.3)
HGB BLD-MCNC: 17.5 G/DL (ref 12.1–17)
IMM GRANULOCYTES # BLD AUTO: 0.1 K/UL (ref 0–0.04)
IMM GRANULOCYTES NFR BLD AUTO: 1 % (ref 0–0.5)
LYMPHOCYTES # BLD: 2.2 K/UL (ref 0.8–3.5)
LYMPHOCYTES NFR BLD: 18 % (ref 12–49)
MAGNESIUM SERPL-MCNC: 2 MG/DL (ref 1.6–2.4)
MCH RBC QN AUTO: 29.5 PG (ref 26–34)
MCHC RBC AUTO-ENTMCNC: 31.6 G/DL (ref 30–36.5)
MCV RBC AUTO: 93.1 FL (ref 80–99)
MONOCYTES # BLD: 1 K/UL (ref 0–1)
MONOCYTES NFR BLD: 9 % (ref 5–13)
NEUTS SEG # BLD: 8.4 K/UL (ref 1.8–8)
NEUTS SEG NFR BLD: 71 % (ref 32–75)
NRBC # BLD: 0 K/UL (ref 0–0.01)
NRBC BLD-RTO: 0 PER 100 WBC
PHOSPHATE SERPL-MCNC: 4.2 MG/DL (ref 2.6–4.7)
PLATELET # BLD AUTO: 380 K/UL (ref 150–400)
PMV BLD AUTO: 11.8 FL (ref 8.9–12.9)
POTASSIUM SERPL-SCNC: 4 MMOL/L (ref 3.5–5.1)
RBC # BLD AUTO: 5.94 M/UL (ref 4.1–5.7)
SERVICE CMNT-IMP: ABNORMAL
SODIUM SERPL-SCNC: 137 MMOL/L (ref 136–145)
WBC # BLD AUTO: 11.9 K/UL (ref 4.1–11.1)

## 2021-08-19 PROCEDURE — 85379 FIBRIN DEGRADATION QUANT: CPT

## 2021-08-19 PROCEDURE — 65660000000 HC RM CCU STEPDOWN

## 2021-08-19 PROCEDURE — 74011250637 HC RX REV CODE- 250/637: Performed by: NURSE PRACTITIONER

## 2021-08-19 PROCEDURE — 36415 COLL VENOUS BLD VENIPUNCTURE: CPT

## 2021-08-19 PROCEDURE — 74011250637 HC RX REV CODE- 250/637: Performed by: HOSPITALIST

## 2021-08-19 PROCEDURE — 74011250636 HC RX REV CODE- 250/636: Performed by: HOSPITALIST

## 2021-08-19 PROCEDURE — 77010033711 HC HIGH FLOW OXYGEN

## 2021-08-19 PROCEDURE — 82962 GLUCOSE BLOOD TEST: CPT

## 2021-08-19 PROCEDURE — 83880 ASSAY OF NATRIURETIC PEPTIDE: CPT

## 2021-08-19 PROCEDURE — 80048 BASIC METABOLIC PNL TOTAL CA: CPT

## 2021-08-19 PROCEDURE — 86140 C-REACTIVE PROTEIN: CPT

## 2021-08-19 PROCEDURE — 84100 ASSAY OF PHOSPHORUS: CPT

## 2021-08-19 PROCEDURE — 99233 SBSQ HOSP IP/OBS HIGH 50: CPT | Performed by: CLINICAL NURSE SPECIALIST

## 2021-08-19 PROCEDURE — 83735 ASSAY OF MAGNESIUM: CPT

## 2021-08-19 PROCEDURE — 74011636637 HC RX REV CODE- 636/637: Performed by: INTERNAL MEDICINE

## 2021-08-19 PROCEDURE — 85025 COMPLETE CBC W/AUTO DIFF WBC: CPT

## 2021-08-19 RX ADMIN — ASPIRIN 81 MG: 81 TABLET, CHEWABLE ORAL at 08:59

## 2021-08-19 RX ADMIN — Medication 10 ML: at 12:17

## 2021-08-19 RX ADMIN — INSULIN LISPRO 4 UNITS: 100 INJECTION, SOLUTION INTRAVENOUS; SUBCUTANEOUS at 22:00

## 2021-08-19 RX ADMIN — INSULIN LISPRO 10 UNITS: 100 INJECTION, SOLUTION INTRAVENOUS; SUBCUTANEOUS at 16:17

## 2021-08-19 RX ADMIN — ATORVASTATIN CALCIUM 20 MG: 20 TABLET, FILM COATED ORAL at 08:59

## 2021-08-19 RX ADMIN — INSULIN LISPRO 7 UNITS: 100 INJECTION, SOLUTION INTRAVENOUS; SUBCUTANEOUS at 16:17

## 2021-08-19 RX ADMIN — OXYCODONE HYDROCHLORIDE AND ACETAMINOPHEN 500 MG: 500 TABLET ORAL at 08:59

## 2021-08-19 RX ADMIN — Medication 2000 UNITS: at 08:59

## 2021-08-19 RX ADMIN — DEXAMETHASONE SODIUM PHOSPHATE 6 MG: 10 INJECTION INTRAMUSCULAR; INTRAVENOUS at 12:12

## 2021-08-19 RX ADMIN — Medication 3 MG: at 21:54

## 2021-08-19 RX ADMIN — ACETAMINOPHEN 650 MG: 325 TABLET ORAL at 04:50

## 2021-08-19 RX ADMIN — ENOXAPARIN SODIUM 30 MG: 40 INJECTION SUBCUTANEOUS at 06:24

## 2021-08-19 RX ADMIN — INSULIN LISPRO 10 UNITS: 100 INJECTION, SOLUTION INTRAVENOUS; SUBCUTANEOUS at 12:11

## 2021-08-19 RX ADMIN — INSULIN GLARGINE 50 UNITS: 100 INJECTION, SOLUTION SUBCUTANEOUS at 09:00

## 2021-08-19 RX ADMIN — INSULIN LISPRO 3 UNITS: 100 INJECTION, SOLUTION INTRAVENOUS; SUBCUTANEOUS at 12:11

## 2021-08-19 RX ADMIN — LOSARTAN POTASSIUM 50 MG: 50 TABLET, FILM COATED ORAL at 08:59

## 2021-08-19 RX ADMIN — OXYCODONE HYDROCHLORIDE AND ACETAMINOPHEN 500 MG: 500 TABLET ORAL at 17:18

## 2021-08-19 RX ADMIN — ENOXAPARIN SODIUM 30 MG: 40 INJECTION SUBCUTANEOUS at 17:18

## 2021-08-19 RX ADMIN — ZINC SULFATE 220 MG (50 MG) CAPSULE 1 CAPSULE: CAPSULE at 08:59

## 2021-08-19 NOTE — ROUTINE PROCESS
Bedside and Verbal shift change report given to SB (oncoming nurse) by Jimmie Roberts (offgoing nurse). Report included the following information SBAR, Kardex, ED Summary, Intake/Output, MAR, Recent Results and Cardiac Rhythm NSR.

## 2021-08-19 NOTE — DIABETES MGMT
[address] [facility]    CLINICAL NURSE SPECIALIST CONSULT     FOLLOW UP NOTE    Initial Presentation   Levar Hayward is a 70 y.o. male admitted s/p + COVID -19 diagnosis last week presented  with low oxygenation/ SOB and fevers for 1-2 days. Not vaccinated for COVID-19. HX: No past medical history on file. 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 C/ Zinc    Consulted by Provider for advanced diabetes nursing assessment and care for:   [] Transitioning off Floria Craft   [x] Inpatient management strategy  [] Home management assessment  [] Survival skill education    Hospital Course   Clinical progress has been complicated by acute hypoxic respiratory failure in setting of COVID-19 pna. Other complicating factors include uncontrolled DM2 at admission. Diabetes History   Patient  with Type 2 diabetes, now of unknown years duration. PTA  treated with Metformin and Glimepiride. . Ambulatory blood glucose management provided by primary care provider- Dr. Markus Neri- last e-visit 3/2021. Patient refused adding a 3rd antihyperglycemic (SGLT-2) at this appt. Latest A1c of 10.7%  indicates poor diabetes control. Previous A1Cs  8.8% Dec. 2020. Admission .     Diabetes-related Medical History  Acute complications  Steroid induced hyperglycemia   Neurological complications  Impotence  Microvascular disease  nephropathy  Macrovascular disease  Cerebral vascular accident - noted by PCP at 3/2021 visit  Other associated conditions     HTN/ hypercholesteremia    Diabetes Medication History  Drug class Currently in use Discontinued Never used   Biguanide Metformin XR 500mg BID     DDP-4 inhibitor       Sulfonylurea Glimepiride 4mg daily-  Glyburide    Thiazolidinedione      GLP-1 RA      SGLT-2 inhibitors Basal insulin      Bolus insulin      Fixed Dose  Combinations        Subjective   Spoke with patient over the phone today - Voice raspy, and a little weak . On 15L hi flow NC 02    Spoke with patient's spouse over the phone. She reports he Emmauel Life been careless with his DM2 management\"and voices frustrations with this. She states they live in a big home and lately have not had the \"help\" they've had before. They used to  Have a cook that cooked for them but that is not in place any more. She voiced she does not cook and would like prepared meals sent to them - Discussed online meal options ([employer]/ [employer]/ [employer]) that would help them with ensuring healthy carb consistent meals. Also discussed frozen meals that they are also an adequate choice as they are portioned out with a protein/veggie and a starch. She states his glucometer is old and confirmed he doesn't check BG either. It seems she is overwhelmed with what's going on and stated \"I've been in bed for the past week\". She did state she tested negative for COVID-19. Also explained what an A1C is (as she did not know). Wife also verbalized he was not consistent with diabetic medications-     Patient reports the following home diabetes self-management practices: deferred  Eating pattern-proteins/ not many veggies/ potatoes  Snacks- candy or chocolate/ apple slices ; apple fritters from [employer] lately, [employer]       Physical activity pattern- ambulates without assistance, not actively exercising per wife     Monitoring pattern-does not check sugars     Taking medications pattern- recently inconsistent?         Overall evaluation:    [x] NOT Achieving A1c target with drug therapy & self-care practices     Objective   Physical exam-  General-KIERSTEN   Neuro - alert/ oriented on phone, conversant    Vital Signs   Visit Vitals  /81   Pulse 68   Temp 99.1 °F (37.3 °C)   Resp 19   Ht 6' (1.829 m)   Wt 93.4 kg (205 lb 12.8 oz)   SpO2 94%   BMI 27.91 kg/m²         Diabetic foot exam:  Vibratory and Filament test: deferred due to clinical condition   . Laboratory  BMP:   Lab Results   Component Value Date/Time     08/19/2021 05:36 AM    K 4.0 08/19/2021 05:36 AM     08/19/2021 05:36 AM    CO2 27 08/19/2021 05:36 AM    AGAP 5 08/19/2021 05:36 AM     (H) 08/19/2021 05:36 AM    BUN 37 (H) 08/19/2021 05:36 AM    CREA 1.09 08/19/2021 05:36 AM    GFRAA >60 08/19/2021 05:36 AM    GFRNA >60 08/19/2021 05:36 AM      Factors impacting BG management  Factor Dose Comments   Nutrition:  Standard meals     60 grams/meal   \" I'm eating like a horse\"   Drugs:    Steroids       Decadron 6mg daily       Impairs insulin action    Decadron insulin dosing    >8mg dexamethosone = 0.4units/kg   6mg   dexamethosone = 0.3units/kg   4mg   dexamethosone = 0.2units/kg   2mg   dexamethosone = 0.1units/kg    Pain     Infection COVID-19 pna  C reactive 4.06 (improved from prior )    Other: CKD GFR 46, Cr+ 1.49      Blood glucose pattern        Assessment and Plan   Nursing Diagnosis Risk for unstable blood glucose pattern   Nursing Intervention Domain 5259 Decision-making Support   Nursing Interventions Examined current inpatient diabetes control   Explored factors facilitating and impeding inpatient management  Identified self-management practices impeding diabetes control  Explored corrective strategies with patient and responsible inpatient provider   Informed patient of rational for insulin strategy while hospitalized     Evaluation   This  male , with Type 2 diabetes, did notachieve diabetes control prior to admission, as evidenced by A1c of 10.7%. Currently admitted for COVID-19 pna in setting of uncontrolled DM2. Requiring steroid therapy and hi flow02 to maintain oxygenation. Re: DM2 recent management, information gleaned from a chart review.   He last had e-visit with PCP in 3/2021, and PCP recommended adding another antihyperglycemic med to his regimen but patient wanted to wait until May 2021 visit. Never seen in May 2021 per chart review. He was taking Metformin and Glimepiride daily. BG trends since admission consistent with steroid induced hyperglycemia assoc. With daily Decadron. Noted basal insulin dose increased today from 20 to 40 units daily. During this hospitalization, the patient has not achieved inpatient blood glucose target of 100-180mg/dl. Several factors have played a role in blood glucose management including:  [x] Critical nature of illness state  []  Changing nutritive sources & needs   [x] Compromised insulin absorption or delivery  [x] Glucocorticoid use  []  Kidney dysfunction  []  Liver disease    The Subcutaneous Insulin Order set (8683) has  been in use. Hence, the next step in optimizing blood glucose control would be    [x] Implement the Subcutaneous Insulin order set  [x]  Optimize basal insulin dosing   []  Add mealtime insulin    Blood glucose management would be improved by  [x]  Adding insulin to override impact of steroids      BG trends improved overnight-  today; required 24units of correctional insulin on 8/17/21  Tolerating diet at 100%- \"i'm eating like a horse'-   Continues on steroid regimen  Recommendations   1. IF FBG in am >200, recommend adjusting basal insulin per recs below. [] Use of Subcutaneous Insulin Order set (8843)  Insulin Dosing Specific recommendation   CONTINUE  Basal                                      (Based on weight, BMI & GFR) 50units daily-Lantus    0.2u/kg dosing for DM2= 20units  0.3u/kg dosing for steroid=30 units  TDD 50units    ADJUST  pre meal Nutritional                                      (Based on CHO/dextrose load) [x] Normal sensitivity  10 units Humalog TID    MODIFIED Corrective                                       (Useful in adjusting insulin dosing)   [x] Insulin-resistant sensitivity- on steroids      Discharge Planning   1. A1C >10%.  Recommend basal insulin to decrease risk of long term complications- dose TBD  2. Re-start metformin and glipizide at PTA dosing  3. Prescription for glucometer kit (Meter, Lancets (100), Strips (100)). Patient to obtain a blood glucose reading four times daily. First thing in the morning prior to eating and drinking anything then before lunch, dinner and bedtime. Create a log and present to PCP for interpretation ([employer]ION brand)  Billing Code(s)   39350      Before making these care recommendations, I personally reviewed the hospitalization record, including notes, laboratory & diagnostic data and current medications, and examined the patient at the bedside (circumstances permitting) before making care recommendations.      Total minutes: [address] MICHELLE Haider  Diabetes Clinical Nurse Specialist  [facility]  Access via [address] [place] [address]

## 2021-08-19 NOTE — PROGRESS NOTES
[facility]  Hospitalist Group                                                                                          Hospitalist Progress Note  Carloyn Del Rio MD  Answering service: [phone] from in house phone        Date of Service:  2021  NAME:  Bella Maldonado  :  1950  MRN:  [id number]      Admission Summary: This is a 77-year-old  male with past medical history significant for type 2 diabetes mellitus, hypertension, and hypercholesterolemia, who presented to [facility] emergency department with low oxygen 84% at home and fever, 1-2 days. Interval history / Subjective:   Pt seen and examined today. feels well systemically, has episode of headache this am. Resolved now. On 13L midflow, sats low 90s. Assessment & Plan:     COVID 19 PNA  Acute hypoxic respiratory failure  - O2 to maintain saturations over 90%  - Continue dexamethasone, VitC, Zinc, VitD  - Encouraged prone positioning, incentive spirometry   - Procalcitonin is 0.26 hold antibiotics for now   - Monitor inflammatory markers - AC prophylaxis per protocol - s/p Actemra 8/15  - pulmonary following    DM2 - A1c 10.7%- SSI, lantus. preMeal- DTC followiing  HTN - Continue losartan and monitor BP   HLD - statin     Code status: DNR, DDNR signed   DVT prophylaxis: lovenox   Care Plan discussed with: Patient/Family  Disposition: Home w/Family Greater than 48 hours     Hospital Problems  Never Reviewed        Codes Class Noted POA    Pneumonia due to COVID-19 virus ICD-10-CM: U07.1, J12.82  ICD-9-CM: 480.8, 079.89  2021 Unknown                Review of Systems:   A comprehensive review of systems was negative except for that written in the HPI. Vital Signs:    Last 24hrs VS reviewed since prior progress note.  Most recent are:  Visit Vitals  /74   Pulse 67   Temp 98.9 °F (37.2 °C)   Resp 22   Ht 6' (1.829 m)   Wt 93.4 kg (205 lb 12.8 oz)   SpO2 90%   BMI 27.91 kg/m² No intake or output data in the 24 hours ending 08/19/21 1200     Physical Examination:     I had a face to face encounter with this patient and independently examined them on 8/19/2021 as outlined below:          Constitutional:  Mild resp distress, cooperative, pleasant        Resp:  Crackles bilaterally. No wheezing/rhonchi/rales. Tachypnea. Hypoxic with low 90's on 15L    CV:  Regular rhythm, normal rate, no murmurs, gallops, rubs    GI:  Soft, non distended, non tender. normoactive bowel sounds, no hepatosplenomegaly     Musculoskeletal:  No edema, warm, 2+ pulses throughout    Neurologic:  Moves all extremities. AAOx3, CN II-XII reviewed       Data Review:    Review and/or order of clinical lab test  Review and/or order of tests in the radiology section of CPT  Review and/or order of tests in the medicine section of CPT      Labs:     Recent Labs     08/19/21  0536 08/18/21  0418   WBC 11.9* 9.6   HGB 17.5* 17.1*   HCT 55.3* 53.4*    373     Recent Labs     08/19/21  0536 08/18/21  0418 08/17/21  0455    137 138   K 4.0 5.2* 4.1    104 102   CO2 27 23 29   BUN 37* 34* 38*   CREA 1.09 1.24 1.49*   * 266* 362*   CA 9.9 9.7 9.3   MG 2.0 2.1 1.9   PHOS 4.2 4.0 4.8*     No results for input(s): ALT, AP, TBIL, TBILI, TP, ALB, GLOB, GGT, AML, LPSE in the last 72 hours. No lab exists for component: SGOT, GPT, AMYP, HLPSE  No results for input(s): INR, PTP, APTT, INREXT, INREXT in the last 72 hours. No results for input(s): FE, TIBC, PSAT, FERR in the last 72 hours. No results found for: FOL, RBCF   No results for input(s): PH, PCO2, PO2 in the last 72 hours. No results for input(s): CPK, CKNDX, TROIQ in the last 72 hours.     No lab exists for component: CPKMB  No results found for: CHOL, CHOLX, CHLST, CHOLV, HDL, HDLP, LDL, LDLC, DLDLP, TGLX, TRIGL, TRIGP, CHHD, CHHDX  Lab Results   Component Value Date/Time    Glucose (POC) 187 (H) 08/19/2021 11:56 AM    Glucose (POC) 183 (H) 08/19/2021 06:24 AM    Glucose (POC) 309 (H) 08/18/2021 09:41 PM    Glucose (POC) 269 (H) 08/18/2021 04:38 PM    Glucose (POC) 215 (H) 08/18/2021 11:41 AM     No results found for: COLOR, APPRN, SPGRU, REFSG, KYLER, PROTU, GLUCU, KETU, BILU, UROU, AQUILES, LEUKU, GLUKE, EPSU, BACTU, WBCU, RBCU, CASTS, UCRY      Medications Reviewed:     Current Facility-Administered Medications   Medication Dose Route Frequency    insulin lispro (HUMALOG) injection 10 Units  10 Units SubCUTAneous TIDAC    melatonin tablet 3 mg  3 mg Oral QHS PRN    insulin lispro (HUMALOG) injection   SubCUTAneous AC&HS    dextrose (D50W) injection syrg 12.5-25 g  12.5-25 g IntraVENous PRN    albuterol (PROVENTIL HFA, VENTOLIN HFA, PROAIR HFA) inhaler 2 Puff  2 Puff Inhalation Q4H PRN    insulin glargine (LANTUS) injection 50 Units  50 Units SubCUTAneous DAILY    enoxaparin (LOVENOX) injection 30 mg  30 mg SubCUTAneous Q12H    acetaminophen (TYLENOL) tablet 650 mg  650 mg Oral Q6H PRN    Or    acetaminophen (TYLENOL) suppository 650 mg  650 mg Rectal Q6H PRN    dexamethasone (PF) (DECADRON) 10 mg/mL injection 6 mg  6 mg IntraVENous Q24H    cholecalciferol (VITAMIN D3) (1000 Units /25 mcg) tablet 2,000 Units  2,000 Units Oral DAILY    glucose chewable tablet 16 g  4 Tablet Oral PRN    dextrose (D50W) injection syrg 12.5-25 g  12.5-25 g IntraVENous PRN    glucagon (GLUCAGEN) injection 1 mg  1 mg IntraMUSCular PRN    ascorbic acid (vitamin C) (VITAMIN C) tablet 500 mg  500 mg Oral BID    zinc sulfate (ZINCATE) 50 mg zinc (220 mg) capsule 1 Capsule  1 Capsule Oral DAILY    sodium chloride (NS) flush 5-40 mL  5-40 mL IntraVENous Q8H    sodium chloride (NS) flush 5-40 mL  5-40 mL IntraVENous PRN    aspirin chewable tablet 81 mg  81 mg Oral DAILY    losartan (COZAAR) tablet 50 mg  50 mg Oral DAILY    atorvastatin (LIPITOR) tablet 20 mg  20 mg Oral DAILY     ______________________________________________________________________  EXPECTED LENGTH OF STAY: 5d 9h  ACTUAL LENGTH OF STAY:          Rk Cox MD

## 2021-08-19 NOTE — PROGRESS NOTES
Transition of Care:     1. RUR-13%     2. Disposition: TBD: Possibly home with wife with follow up instructions. Awaiting recs from MD. Possible home O2     3. PCP F/u: Wife reports patient sees Dr. [id number]-[phone] at [facility]. Reports that she does not believe he has been seen since the Fall of 2020     4.  Transport: TBD: Wife agreeable to transport patient if discharged to home.        Pending Discharge:  Pending weaning O2-currently on 14%-08/19/21  Pending medical progress and care recommendations    Will continue to follow for any CM needs that may arise    Griselda Dent RN,CRM

## 2021-08-20 LAB
GLUCOSE BLD STRIP.AUTO-MCNC: 155 MG/DL (ref 65–117)
GLUCOSE BLD STRIP.AUTO-MCNC: 164 MG/DL (ref 65–117)
GLUCOSE BLD STRIP.AUTO-MCNC: 228 MG/DL (ref 65–117)
GLUCOSE BLD STRIP.AUTO-MCNC: 308 MG/DL (ref 65–117)
SERVICE CMNT-IMP: ABNORMAL

## 2021-08-20 PROCEDURE — 99231 SBSQ HOSP IP/OBS SF/LOW 25: CPT | Performed by: CLINICAL NURSE SPECIALIST

## 2021-08-20 PROCEDURE — 74011250637 HC RX REV CODE- 250/637: Performed by: HOSPITALIST

## 2021-08-20 PROCEDURE — 65660000000 HC RM CCU STEPDOWN

## 2021-08-20 PROCEDURE — 74011636637 HC RX REV CODE- 636/637: Performed by: INTERNAL MEDICINE

## 2021-08-20 PROCEDURE — 74011250636 HC RX REV CODE- 250/636: Performed by: HOSPITALIST

## 2021-08-20 PROCEDURE — 82962 GLUCOSE BLOOD TEST: CPT

## 2021-08-20 RX ORDER — INSULIN LISPRO 100 [IU]/ML
12 INJECTION, SOLUTION INTRAVENOUS; SUBCUTANEOUS
Status: DISCONTINUED | OUTPATIENT
Start: 2021-08-20 | End: 2021-08-22 | Stop reason: HOSPADM

## 2021-08-20 RX ADMIN — OXYCODONE HYDROCHLORIDE AND ACETAMINOPHEN 500 MG: 500 TABLET ORAL at 08:52

## 2021-08-20 RX ADMIN — INSULIN LISPRO 4 UNITS: 100 INJECTION, SOLUTION INTRAVENOUS; SUBCUTANEOUS at 17:54

## 2021-08-20 RX ADMIN — INSULIN LISPRO 10 UNITS: 100 INJECTION, SOLUTION INTRAVENOUS; SUBCUTANEOUS at 08:51

## 2021-08-20 RX ADMIN — ASPIRIN 81 MG: 81 TABLET, CHEWABLE ORAL at 08:52

## 2021-08-20 RX ADMIN — OXYCODONE HYDROCHLORIDE AND ACETAMINOPHEN 500 MG: 500 TABLET ORAL at 17:53

## 2021-08-20 RX ADMIN — INSULIN LISPRO 3 UNITS: 100 INJECTION, SOLUTION INTRAVENOUS; SUBCUTANEOUS at 12:39

## 2021-08-20 RX ADMIN — ENOXAPARIN SODIUM 30 MG: 40 INJECTION SUBCUTANEOUS at 17:53

## 2021-08-20 RX ADMIN — INSULIN GLARGINE 50 UNITS: 100 INJECTION, SOLUTION SUBCUTANEOUS at 08:51

## 2021-08-20 RX ADMIN — ZINC SULFATE 220 MG (50 MG) CAPSULE 1 CAPSULE: CAPSULE at 08:52

## 2021-08-20 RX ADMIN — LOSARTAN POTASSIUM 50 MG: 50 TABLET, FILM COATED ORAL at 08:52

## 2021-08-20 RX ADMIN — INSULIN LISPRO 3 UNITS: 100 INJECTION, SOLUTION INTRAVENOUS; SUBCUTANEOUS at 08:52

## 2021-08-20 RX ADMIN — Medication 10 ML: at 13:38

## 2021-08-20 RX ADMIN — DEXAMETHASONE SODIUM PHOSPHATE 6 MG: 10 INJECTION INTRAMUSCULAR; INTRAVENOUS at 12:39

## 2021-08-20 RX ADMIN — INSULIN LISPRO 10 UNITS: 100 INJECTION, SOLUTION INTRAVENOUS; SUBCUTANEOUS at 21:48

## 2021-08-20 RX ADMIN — INSULIN LISPRO 12 UNITS: 100 INJECTION, SOLUTION INTRAVENOUS; SUBCUTANEOUS at 17:53

## 2021-08-20 RX ADMIN — Medication 2000 UNITS: at 08:52

## 2021-08-20 RX ADMIN — INSULIN LISPRO 12 UNITS: 100 INJECTION, SOLUTION INTRAVENOUS; SUBCUTANEOUS at 12:39

## 2021-08-20 RX ADMIN — ATORVASTATIN CALCIUM 20 MG: 20 TABLET, FILM COATED ORAL at 08:52

## 2021-08-20 RX ADMIN — ENOXAPARIN SODIUM 30 MG: 40 INJECTION SUBCUTANEOUS at 06:18

## 2021-08-20 NOTE — ROUTINE PROCESS
Bedside shift change report given to [address] (oncoming nurse) by Kanchan Mcclendon (offgoing nurse). Report included the following information SBAR, Kardex, MAR and Cardiac Rhythm NSR.

## 2021-08-20 NOTE — ROUTINE PROCESS
Bedside and Verbal shift change report given to Shivani Mae (oncoming nurse) by Jolanta Redding (offgoing nurse). Report included the following information SBAR, Kardex, ED Summary, Intake/Output, MAR, Recent Results and Cardiac Rhythm NSR.

## 2021-08-20 NOTE — DIABETES MGMT
[facility]    CLINICAL NURSE SPECIALIST CONSULT     FOLLOW UP NOTE    Initial Presentation   Violetta Ortiz is a 70 y.o. male admitted s/p + COVID -19 diagnosis last week presented  with low oxygenation/ SOB and fevers for 1-2 days. Not vaccinated for COVID-19. HX: No past medical history on file. 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 C/ Zinc    Consulted by Provider for advanced diabetes nursing assessment and care for:   [] Transitioning off Azalea Celso   [x] Inpatient management strategy  [] Home management assessment  [] Survival skill education    Hospital Course   Clinical progress has been complicated by acute hypoxic respiratory failure in setting of COVID-19 pna. Other complicating factors include uncontrolled DM2 at admission. Diabetes History   Patient  with Type 2 diabetes, now of unknown years duration. PTA  treated with Metformin and Glimepiride. . Ambulatory blood glucose management provided by primary care provider- Dr. Jose Chandler- last e-visit 3/2021. Patient refused adding a 3rd antihyperglycemic (SGLT-2) at this appt. Latest A1c of 10.7%  indicates poor diabetes control. Previous A1Cs  8.8% Dec. 2020. Admission .     Diabetes-related Medical History  Acute complications  Steroid induced hyperglycemia   Neurological complications  Impotence  Microvascular disease  nephropathy  Macrovascular disease  Cerebral vascular accident - noted by PCP at 3/2021 visit  Other associated conditions     HTN/ hypercholesteremia    Diabetes Medication History  Drug class Currently in use Discontinued Never used   Biguanide Metformin XR 500mg BID     DDP-4 inhibitor       Sulfonylurea Glimepiride 4mg daily-  Glyburide    Thiazolidinedione      GLP-1 RA      SGLT-2 inhibitors Basal insulin      Bolus insulin      Fixed Dose  Combinations        Subjective   Spoke with patient over the phone today - Voice raspy, and a little weak . On 15L hi flow NC 02    Spoke with patient's spouse over the phone. She reports he Dinorah Leader been careless with his DM2 management\"and voices frustrations with this. She states they live in a big home and lately have not had the \"help\" they've had before. They used to  Have a cook that cooked for them but that is not in place any more. She voiced she does not cook and would like prepared meals sent to them - Discussed online meal options ([employer]/ [employer]/ [employer]) that would help them with ensuring healthy carb consistent meals. Also discussed frozen meals that they are also an adequate choice as they are portioned out with a protein/veggie and a starch. She states his glucometer is old and confirmed he doesn't check BG either. It seems she is overwhelmed with what's going on and stated \"I've been in bed for the past week\". She did state she tested negative for COVID-19. Also explained what an A1C is (as she did not know). Wife also verbalized he was not consistent with diabetic medications-     Patient reports the following home diabetes self-management practices: deferred  Eating pattern-proteins/ not many veggies/ potatoes  Snacks- candy or chocolate/ apple slices ; apple fritters from [employer] lately, [employer]       Physical activity pattern- ambulates without assistance, not actively exercising per wife     Monitoring pattern-does not check sugars     Taking medications pattern- recently inconsistent?         Overall evaluation:    [x] NOT Achieving A1c target with drug therapy & self-care practices     Objective   Physical exam-  General-KIERSTEN   Neuro - alert/ oriented on phone, conversant    Vital Signs   Visit Vitals  /83 (BP 1 Location: Left upper arm, BP Patient Position: At rest)   Pulse 67   Temp 97.6 °F (36.4 °C)   Resp 21   Ht 6' (1.829 m)   Wt 92.6 kg (204 lb 3.2 oz)   SpO2 92%   BMI 27.69 kg/m²         Diabetic foot exam:  Vibratory and Filament test: deferred due to clinical condition   . Laboratory  BMP:   No results found for: NA, K, CL, CO2, AGAP, GLU, BUN, CREA, GFRAA, GFRNA   Factors impacting BG management  Factor Dose Comments   Nutrition:  Standard meals     60 grams/meal   \" I'm eating like a horse\"   Drugs:    Steroids       Decadron 6mg daily       Impairs insulin action    Decadron insulin dosing    >8mg dexamethosone = 0.4units/kg   6mg   dexamethosone = 0.3units/kg   4mg   dexamethosone = 0.2units/kg   2mg   dexamethosone = 0.1units/kg    Pain     Infection COVID-19 pna  C reactive 4.06 (improved from prior )    Other: CKD GFR 46, Cr+ 1.49      Blood glucose pattern        Assessment and Plan   Nursing Diagnosis Risk for unstable blood glucose pattern   Nursing Intervention Domain 5250 Decision-making Support   Nursing Interventions Examined current inpatient diabetes control   Explored factors facilitating and impeding inpatient management  Identified self-management practices impeding diabetes control  Explored corrective strategies with patient and responsible inpatient provider   Informed patient of rational for insulin strategy while hospitalized     Evaluation   This  male , with Type 2 diabetes, did notachieve diabetes control prior to admission, as evidenced by A1c of 10.7%. Currently admitted for COVID-19 pna in setting of uncontrolled DM2. Requiring steroid therapy and hi flow02 to maintain oxygenation. Re: DM2 recent management, information gleaned from a chart review. He last had e-visit with PCP in 3/2021, and PCP recommended adding another antihyperglycemic med to his regimen but patient wanted to wait until May 2021 visit. Never seen in May 2021 per chart review. He was taking Metformin and Glimepiride daily. BG trends since admission consistent with steroid induced hyperglycemia assoc. With daily Decadron. Noted basal insulin dose increased today from 20 to 40 units daily. During this hospitalization, the patient has not achieved inpatient blood glucose target of 100-180mg/dl. Several factors have played a role in blood glucose management including:  [x] Critical nature of illness state  []  Changing nutritive sources & needs   [x] Compromised insulin absorption or delivery  [x] Glucocorticoid use  []  Kidney dysfunction  []  Liver disease    The Subcutaneous Insulin Order set (4889) has  been in use. Hence, the next step in optimizing blood glucose control would be    [x] Implement the Subcutaneous Insulin order set  [x]  Optimize basal insulin dosing   []  Add mealtime insulin    Blood glucose management would be improved by  [x]  Adding insulin to override impact of steroids       today  Noted pre and post prandial hyperglycemia - could safely tolerate adjustment in pre meal insulin. Recommendations   1. IF FBG in am >200, recommend adjusting basal insulin per recs below. [] Use of Subcutaneous Insulin Order set (4223)  Insulin Dosing Specific recommendation   CONTINUE  Basal                                      (Based on weight, BMI & GFR) 50units daily-Lantus    0.2u/kg dosing for DM2= 20units  0.3u/kg dosing for steroid=30 units  TDD 50units    MODIFIED  pre meal Nutritional                                      (Based on CHO/dextrose load) [x] Normal sensitivity  12 units Humalog TID    MODIFIED Corrective                                       (Useful in adjusting insulin dosing)   [x] Insulin-resistant sensitivity- on steroids    2. Nursing to encourage and teach patient how to administer insulin and check a BG. Please allow patient to practice this as RN gives insulin. Discharge Planning   1. A1C >10%. Recommend basal insulin to decrease risk of long term complications- dose TBD  2. Re-start metformin and glipizide at PTA dosing  3.  Prescription for glucometer kit (Meter, Lancets (100), Strips (100)). Patient to obtain a blood glucose reading four times daily. First thing in the morning prior to eating and drinking anything then before lunch, dinner and bedtime. Create a log and present to PCP for interpretation ([employer] brand)  4. Billing Code(s)   34229      Before making these care recommendations, I personally reviewed the hospitalization record, including notes, laboratory & diagnostic data and current medications, and examined the patient at the bedside (circumstances permitting) before making care recommendations.      Total minutes: [address] MICHELLE Gregory  Diabetes Clinical Nurse Specialist  [facility]  Access via [employer]

## 2021-08-20 NOTE — PROGRESS NOTES
[address] [facility]  Hospitalist Group                                                                                          Hospitalist Progress Note  Carolyn Del Rio MD  Answering service: [phone] from in house phone        Date of Service:  2021  NAME:  Bella Maldonado  :  1950  MRN:  [id number]    Admission Summary: This is a 51-year-old  male with past medical history significant for type 2 diabetes mellitus, hypertension, and hypercholesterolemia, who presented to [facility] emergency department with low oxygen 84% at home and fever, 1-2 days. Interval history / Subjective:   Pt seen and examined. Feels well, no acute events. Describes some occasional hallucinations since started on dexamethasone' not bad'. Has some watery eyes. Down to 11L- keeping sats low 90s     Assessment & Plan:     COVID 19 PNA  Acute hypoxic respiratory failure  - O2 to maintain saturations over 90%- dexamethasone, VitC, Zinc, VitD  - Encouraged prone positioning, incentive spirometry   - Procalcitonin is 0.26 hold antibiotics for now   - Monitor inflammatory markers - AC prophylaxis per protocol - s/p Actemra 8/15  - pulmonary following    DM2 - A1c 10.7%- SSI, lantus. preMeal- DTC followiing  HTN - Continue losartan and monitor BP   HLD - statin     Code status: DNR, DDNR signed   DVT prophylaxis: lovenox   Care Plan discussed with: Patient/Family  Disposition: Home w/Family Greater than 48 hours     Hospital Problems  Never Reviewed        Codes Class Noted POA    Pneumonia due to COVID-19 virus ICD-10-CM: U07.1, J12.82  ICD-9-CM: 480.8, 079.89  2021 Unknown                Review of Systems:   A comprehensive review of systems was negative except for that written in the HPI. Vital Signs:    Last 24hrs VS reviewed since prior progress note.  Most recent are:  Visit Vitals  /83 (BP 1 Location: Left upper arm, BP Patient Position: At rest)   Pulse 67   Temp 97.6 °F (36.4 °C)   Resp 21   Ht 6' (1.829 m)   Wt 92.6 kg (204 lb 3.2 oz)   SpO2 92%   BMI 27.69 kg/m²       No intake or output data in the 24 hours ending 08/20/21 5405     Physical Examination:     I had a face to face encounter with this patient and independently examined them on 8/20/2021 as outlined below:          Constitutional:  Mild resp distress, cooperative, pleasant elderly WM       Resp:  no crackles. No wheezing. Tachypnea. RR 14        GI:  Soft, non distended, non tender. normoactive bowel sounds, no hepatosplenomegaly     Musculoskeletal:  No edema, warm, 2+ pulses throughout    Neurologic:  Moves all extremities. AAOx3, CN II-XII reviewed       Data Review:    Review and/or order of clinical lab test  Review and/or order of tests in the radiology section of CPT  Review and/or order of tests in the medicine section of [facility]      Labs:     Recent Labs     08/19/21  0536 08/18/21  0418   WBC 11.9* 9.6   HGB 17.5* 17.1*   HCT 55.3* 53.4*    373     Recent Labs     08/19/21  0536 08/18/21  0418    137   K 4.0 5.2*    104   CO2 27 23   BUN 37* 34*   CREA 1.09 1.24   * 266*   CA 9.9 9.7   MG 2.0 2.1   PHOS 4.2 4.0     No results for input(s): ALT, AP, TBIL, TBILI, TP, ALB, GLOB, GGT, AML, LPSE in the last 72 hours. No lab exists for component: SGOT, GPT, AMYP, HLPSE  No results for input(s): INR, PTP, APTT, INREXT, INREXT in the last 72 hours. No results for input(s): FE, TIBC, PSAT, FERR in the last 72 hours. No results found for: FOL, RBCF   No results for input(s): PH, PCO2, PO2 in the last 72 hours. No results for input(s): CPK, CKNDX, TROIQ in the last 72 hours.     No lab exists for component: CPKMB  No results found for: CHOL, CHOLX, CHLST, CHOLV, HDL, HDLP, LDL, LDLC, DLDLP, TGLX, TRIGL, TRIGP, CHHD, CHHDX  Lab Results   Component Value Date/Time    Glucose (POC) 164 (H) 08/20/2021 06:17 AM    Glucose (POC) 253 (H) 08/19/2021 08:48 PM    Glucose (POC) 285 (H) 08/19/2021 03:17 PM    Glucose (POC) 187 (H) 08/19/2021 11:56 AM    Glucose (POC) 183 (H) 08/19/2021 06:24 AM     No results found for: COLOR, APPRN, SPGRU, REFSG, KYLER, PROTU, GLUCU, KETU, BILU, UROU, AQUILES, LEUKU, GLUKE, EPSU, BACTU, WBCU, RBCU, CASTS, UCRY      Medications Reviewed:     Current Facility-Administered Medications   Medication Dose Route Frequency    insulin lispro (HUMALOG) injection 10 Units  10 Units SubCUTAneous TIDAC    melatonin tablet 3 mg  3 mg Oral QHS PRN    insulin lispro (HUMALOG) injection   SubCUTAneous AC&HS    dextrose (D50W) injection syrg 12.5-25 g  12.5-25 g IntraVENous PRN    albuterol (PROVENTIL HFA, VENTOLIN HFA, PROAIR HFA) inhaler 2 Puff  2 Puff Inhalation Q4H PRN    insulin glargine (LANTUS) injection 50 Units  50 Units SubCUTAneous DAILY    enoxaparin (LOVENOX) injection 30 mg  30 mg SubCUTAneous Q12H    acetaminophen (TYLENOL) tablet 650 mg  650 mg Oral Q6H PRN    Or    acetaminophen (TYLENOL) suppository 650 mg  650 mg Rectal Q6H PRN    dexamethasone (PF) (DECADRON) 10 mg/mL injection 6 mg  6 mg IntraVENous Q24H    cholecalciferol (VITAMIN D3) (1000 Units /25 mcg) tablet 2,000 Units  2,000 Units Oral DAILY    glucose chewable tablet 16 g  4 Tablet Oral PRN    dextrose (D50W) injection syrg 12.5-25 g  12.5-25 g IntraVENous PRN    glucagon (GLUCAGEN) injection 1 mg  1 mg IntraMUSCular PRN    ascorbic acid (vitamin C) (VITAMIN C) tablet 500 mg  500 mg Oral BID    zinc sulfate (ZINCATE) 50 mg zinc (220 mg) capsule 1 Capsule  1 Capsule Oral DAILY    sodium chloride (NS) flush 5-40 mL  5-40 mL IntraVENous Q8H    sodium chloride (NS) flush 5-40 mL  5-40 mL IntraVENous PRN    aspirin chewable tablet 81 mg  81 mg Oral DAILY    losartan (COZAAR) tablet 50 mg  50 mg Oral DAILY    atorvastatin (LIPITOR) tablet 20 mg  20 mg Oral DAILY     ______________________________________________________________________  EXPECTED LENGTH OF STAY: 5d 9h  ACTUAL LENGTH OF STAY: [facility] MD

## 2021-08-20 NOTE — PROGRESS NOTES
Bedside shift change report given to Eric Strong RN (oncoming nurse) by Camryn Broussard (offgoing nurse).  Report included the following information SBAR, Intake/Output, MAR, Recent Results and Cardiac Rhythm SR.

## 2021-08-21 LAB
BNP SERPL-MCNC: 100 PG/ML
CRP SERPL-MCNC: 0.37 MG/DL (ref 0–0.6)
D DIMER PPP FEU-MCNC: 0.79 MG/L FEU (ref 0–0.65)
GLUCOSE BLD STRIP.AUTO-MCNC: 152 MG/DL (ref 65–117)
GLUCOSE BLD STRIP.AUTO-MCNC: 196 MG/DL (ref 65–117)
GLUCOSE BLD STRIP.AUTO-MCNC: 196 MG/DL (ref 65–117)
GLUCOSE BLD STRIP.AUTO-MCNC: 350 MG/DL (ref 65–117)
SERVICE CMNT-IMP: ABNORMAL

## 2021-08-21 PROCEDURE — 74011636637 HC RX REV CODE- 636/637: Performed by: INTERNAL MEDICINE

## 2021-08-21 PROCEDURE — 65660000000 HC RM CCU STEPDOWN

## 2021-08-21 PROCEDURE — 82962 GLUCOSE BLOOD TEST: CPT

## 2021-08-21 PROCEDURE — 74011250637 HC RX REV CODE- 250/637: Performed by: HOSPITALIST

## 2021-08-21 PROCEDURE — 74011250636 HC RX REV CODE- 250/636: Performed by: HOSPITALIST

## 2021-08-21 PROCEDURE — 83880 ASSAY OF NATRIURETIC PEPTIDE: CPT

## 2021-08-21 PROCEDURE — 77010033711 HC HIGH FLOW OXYGEN

## 2021-08-21 PROCEDURE — 86140 C-REACTIVE PROTEIN: CPT

## 2021-08-21 PROCEDURE — 85379 FIBRIN DEGRADATION QUANT: CPT

## 2021-08-21 PROCEDURE — 36415 COLL VENOUS BLD VENIPUNCTURE: CPT

## 2021-08-21 PROCEDURE — 74011636637 HC RX REV CODE- 636/637: Performed by: NURSE PRACTITIONER

## 2021-08-21 RX ORDER — INSULIN LISPRO 100 [IU]/ML
6 INJECTION, SOLUTION INTRAVENOUS; SUBCUTANEOUS ONCE
Status: COMPLETED | OUTPATIENT
Start: 2021-08-21 | End: 2021-08-21

## 2021-08-21 RX ADMIN — ENOXAPARIN SODIUM 30 MG: 40 INJECTION SUBCUTANEOUS at 06:16

## 2021-08-21 RX ADMIN — Medication 2000 UNITS: at 08:28

## 2021-08-21 RX ADMIN — ZINC SULFATE 220 MG (50 MG) CAPSULE 1 CAPSULE: CAPSULE at 08:28

## 2021-08-21 RX ADMIN — DEXAMETHASONE SODIUM PHOSPHATE 6 MG: 10 INJECTION INTRAMUSCULAR; INTRAVENOUS at 11:30

## 2021-08-21 RX ADMIN — Medication 10 ML: at 15:58

## 2021-08-21 RX ADMIN — ATORVASTATIN CALCIUM 20 MG: 20 TABLET, FILM COATED ORAL at 08:28

## 2021-08-21 RX ADMIN — INSULIN LISPRO 6 UNITS: 100 INJECTION, SOLUTION INTRAVENOUS; SUBCUTANEOUS at 22:09

## 2021-08-21 RX ADMIN — INSULIN GLARGINE 50 UNITS: 100 INJECTION, SOLUTION SUBCUTANEOUS at 08:28

## 2021-08-21 RX ADMIN — INSULIN LISPRO 3 UNITS: 100 INJECTION, SOLUTION INTRAVENOUS; SUBCUTANEOUS at 15:52

## 2021-08-21 RX ADMIN — Medication 10 ML: at 06:16

## 2021-08-21 RX ADMIN — INSULIN LISPRO 3 UNITS: 100 INJECTION, SOLUTION INTRAVENOUS; SUBCUTANEOUS at 07:19

## 2021-08-21 RX ADMIN — ASPIRIN 81 MG: 81 TABLET, CHEWABLE ORAL at 08:28

## 2021-08-21 RX ADMIN — OXYCODONE HYDROCHLORIDE AND ACETAMINOPHEN 500 MG: 500 TABLET ORAL at 08:28

## 2021-08-21 RX ADMIN — INSULIN LISPRO 12 UNITS: 100 INJECTION, SOLUTION INTRAVENOUS; SUBCUTANEOUS at 07:20

## 2021-08-21 RX ADMIN — INSULIN LISPRO 12 UNITS: 100 INJECTION, SOLUTION INTRAVENOUS; SUBCUTANEOUS at 15:52

## 2021-08-21 RX ADMIN — Medication 10 ML: at 22:09

## 2021-08-21 RX ADMIN — LOSARTAN POTASSIUM 50 MG: 50 TABLET, FILM COATED ORAL at 08:28

## 2021-08-21 RX ADMIN — ENOXAPARIN SODIUM 30 MG: 40 INJECTION SUBCUTANEOUS at 18:08

## 2021-08-21 RX ADMIN — INSULIN LISPRO 12 UNITS: 100 INJECTION, SOLUTION INTRAVENOUS; SUBCUTANEOUS at 11:30

## 2021-08-21 RX ADMIN — INSULIN LISPRO 3 UNITS: 100 INJECTION, SOLUTION INTRAVENOUS; SUBCUTANEOUS at 11:30

## 2021-08-21 RX ADMIN — OXYCODONE HYDROCHLORIDE AND ACETAMINOPHEN 500 MG: 500 TABLET ORAL at 18:08

## 2021-08-21 NOTE — PROGRESS NOTES
[address] [facility]  Hospitalist Group                                                                                          Hospitalist Progress Note  Indra Rondon MD  Answering service: [phone] from in house phone        Date of Service:  2021  NAME:  Madhavi Dacosta  :  1950  MRN:  [id number]    Admission Summary: This is a 80-year-old  male with past medical history significant for type 2 diabetes mellitus, hypertension, and hypercholesterolemia, who presented to [facility] emergency department with low oxygen 84% at home and fever, 1-2 days. Interval history / Subjective:   Pt seen and examined. Feels well, pretty stable, speaking on his wife on phone. No acute events. Will keep weaning down on O2. Today on 12L. Assessment & Plan:     COVID 19 PNA  Acute hypoxic respiratory failure  - O2 to maintain saturations over 90%- dexamethasone, VitC, Zinc, VitD  - Encouraged prone positioning, incentive spirometry   - Procalcitonin is 0.26 hold antibiotics for now   - Monitor inflammatory markers - AC prophylaxis per protocol - s/p Actemra 8/15  - pulmonary following    DM2 - A1c 10.7%- SSI, lantus. preMeal- DTC followiing  HTN - Continue losartan and monitor BP   HLD - statin     Code status: DNR, DDNR signed   DVT prophylaxis: lovenox   Care Plan discussed with: Patient/Family  Disposition: Home w/Family Greater than 48 hours     Hospital Problems  Never Reviewed        Codes Class Noted POA    Pneumonia due to COVID-19 virus ICD-10-CM: U07.1, J12.82  ICD-9-CM: 480.8, 079.89  2021 Unknown                Review of Systems:   A comprehensive review of systems was negative except for that written in the HPI. Vital Signs:    Last 24hrs VS reviewed since prior progress note.  Most recent are:  Visit Vitals  /63 (BP 1 Location: Left arm, BP Patient Position: At rest)   Pulse 63   Temp 97.9 °F (36.6 °C)   Resp 15   Ht 6' (1.829 m)   Wt 92.6 kg (204 lb 3.2 oz)   SpO2 91%   BMI 27.69 kg/m²         Intake/Output Summary (Last 24 hours) at 8/21/2021 5698  Last data filed at 8/21/2021 0414  Gross per 24 hour   Intake 360 ml   Output 1000 ml   Net -640 ml        Physical Examination:     I had a face to face encounter with this patient and independently examined them on 8/21/2021 as outlined below:          Constitutional:  Mild resp distress, cooperative, pleasant elderly WM       Resp:  no crackles. No wheezing. Tachypnea. RR 14        GI:  Soft, non distended, non tender. normoactive bowel sounds, no hepatosplenomegaly     Musculoskeletal:  No edema, warm, 2+ pulses throughout    Neurologic:  Moves all extremities. AAOx3, CN II-XII reviewed       Data Review:    Review and/or order of clinical lab test  Review and/or order of tests in the radiology section of CPT  Review and/or order of tests in the medicine section of CPT      Labs:     Recent Labs     08/19/21  0536   WBC 11.9*   HGB 17.5*   HCT 55.3*        Recent Labs     08/19/21  0536      K 4.0      CO2 27   BUN 37*   CREA 1.09   *   CA 9.9   MG 2.0   PHOS 4.2     No results for input(s): ALT, AP, TBIL, TBILI, TP, ALB, GLOB, GGT, AML, LPSE in the last 72 hours. No lab exists for component: SGOT, GPT, AMYP, HLPSE  No results for input(s): INR, PTP, APTT, INREXT, INREXT in the last 72 hours. No results for input(s): FE, TIBC, PSAT, FERR in the last 72 hours. No results found for: FOL, RBCF   No results for input(s): PH, PCO2, PO2 in the last 72 hours. No results for input(s): CPK, CKNDX, TROIQ in the last 72 hours.     No lab exists for component: CPKMB  No results found for: CHOL, CHOLX, CHLST, CHOLV, HDL, HDLP, LDL, LDLC, DLDLP, TGLX, TRIGL, TRIGP, CHHD, CHHDX  Lab Results   Component Value Date/Time    Glucose (POC) 196 (H) 08/21/2021 06:20 AM    Glucose (POC) 308 (H) 08/20/2021 09:31 PM    Glucose (POC) 228 (H) 08/20/2021 03:33 PM    Glucose (POC) 155 (H) 08/20/2021 11:01 AM    Glucose (POC) 164 (H) 08/20/2021 06:17 AM     No results found for: COLOR, APPRN, SPGRU, REFSG, KYLER, PROTU, GLUCU, KETU, BILU, UROU, AQUILES, LEUKU, GLUKE, EPSU, BACTU, WBCU, RBCU, CASTS, UCRY      Medications Reviewed:     Current Facility-Administered Medications   Medication Dose Route Frequency    insulin lispro (HUMALOG) injection 12 Units  12 Units SubCUTAneous TIDAC    melatonin tablet 3 mg  3 mg Oral QHS PRN    insulin lispro (HUMALOG) injection   SubCUTAneous AC&HS    dextrose (D50W) injection syrg 12.5-25 g  12.5-25 g IntraVENous PRN    albuterol (PROVENTIL HFA, VENTOLIN HFA, PROAIR HFA) inhaler 2 Puff  2 Puff Inhalation Q4H PRN    insulin glargine (LANTUS) injection 50 Units  50 Units SubCUTAneous DAILY    enoxaparin (LOVENOX) injection 30 mg  30 mg SubCUTAneous Q12H    acetaminophen (TYLENOL) tablet 650 mg  650 mg Oral Q6H PRN    Or    acetaminophen (TYLENOL) suppository 650 mg  650 mg Rectal Q6H PRN    dexamethasone (PF) (DECADRON) 10 mg/mL injection 6 mg  6 mg IntraVENous Q24H    cholecalciferol (VITAMIN D3) (1000 Units /25 mcg) tablet 2,000 Units  2,000 Units Oral DAILY    glucose chewable tablet 16 g  4 Tablet Oral PRN    dextrose (D50W) injection syrg 12.5-25 g  12.5-25 g IntraVENous PRN    glucagon (GLUCAGEN) injection 1 mg  1 mg IntraMUSCular PRN    ascorbic acid (vitamin C) (VITAMIN C) tablet 500 mg  500 mg Oral BID    zinc sulfate (ZINCATE) 50 mg zinc (220 mg) capsule 1 Capsule  1 Capsule Oral DAILY    sodium chloride (NS) flush 5-40 mL  5-40 mL IntraVENous Q8H    sodium chloride (NS) flush 5-40 mL  5-40 mL IntraVENous PRN    aspirin chewable tablet 81 mg  81 mg Oral DAILY    losartan (COZAAR) tablet 50 mg  50 mg Oral DAILY    atorvastatin (LIPITOR) tablet 20 mg  20 mg Oral DAILY     ______________________________________________________________________  EXPECTED LENGTH OF STAY: 5d 9h  ACTUAL LENGTH OF STAY:          7                 Atul Craig MD

## 2021-08-21 NOTE — ROUTINE PROCESS
Verbal shift change report given to [address] (oncoming nurse) by To Sanchez (offgoing nurse).  Report included the following information SBAR, Procedure Summary, Intake/Output, MAR, Recent Results and Cardiac Rhythm SR.

## 2021-08-21 NOTE — ROUTINE PROCESS
Bedside and Verbal shift change report given to Arminda Cobian (oncoming nurse) by Dawood Chris (offgoing nurse).  Report included the following information SBAR, Kardex, Intake/Output, MAR and Cardiac Rhythm SR.

## 2021-08-21 NOTE — PROGRESS NOTES
Problem: Risk for Spread of Infection  Goal: Prevent transmission of infectious organism to others  Description: Prevent the transmission of infectious organisms to other patients, staff members, and visitors. Outcome: Progressing Towards Goal     Problem: Diabetes Self-Management  Goal: *Monitoring blood glucose, interpreting and using results  Description: Identify recommended blood glucose targets  and personal targets. Outcome: Progressing Towards Goal     Problem: Pressure Injury - Risk of  Goal: *Prevention of pressure injury  Description: Document Malik Scale and appropriate interventions in the flowsheet. Outcome: Progressing Towards Goal  Note: Pressure Injury Interventions:  Sensory Interventions: Assess changes in LOC    Moisture Interventions: Apply protective barrier, creams and emollients    Activity Interventions: Increase time out of bed    Mobility Interventions: Pressure redistribution bed/mattress (bed type)    Nutrition Interventions: Document food/fluid/supplement intake    Friction and Shear Interventions: Apply protective barrier, creams and emollients                Problem: Breathing Pattern - Ineffective  Goal: Ability to achieve and maintain a regular respiratory rate  Outcome: Progressing Towards Goal     Problem: Falls - Risk of  Goal: *Absence of Falls  Description: Document Lucas Mccarthy Fall Risk and appropriate interventions in the flowsheet.   Outcome: Progressing Towards Goal  Note: Fall Risk Interventions:            Medication Interventions: Teach patient to arise slowly

## 2021-08-22 VITALS
DIASTOLIC BLOOD PRESSURE: 68 MMHG | OXYGEN SATURATION: 97 % | HEIGHT: 72 IN | HEART RATE: 68 BPM | BODY MASS INDEX: 27.78 KG/M2 | WEIGHT: 205.1 LBS | TEMPERATURE: 98 F | RESPIRATION RATE: 20 BRPM | SYSTOLIC BLOOD PRESSURE: 122 MMHG

## 2021-08-22 LAB
BNP SERPL-MCNC: 114 PG/ML
CRP SERPL-MCNC: 0.31 MG/DL (ref 0–0.6)
D DIMER PPP FEU-MCNC: 0.71 MG/L FEU (ref 0–0.65)
GLUCOSE BLD STRIP.AUTO-MCNC: 102 MG/DL (ref 65–117)
GLUCOSE BLD STRIP.AUTO-MCNC: 161 MG/DL (ref 65–117)
GLUCOSE BLD STRIP.AUTO-MCNC: 201 MG/DL (ref 65–117)
SERVICE CMNT-IMP: ABNORMAL
SERVICE CMNT-IMP: ABNORMAL
SERVICE CMNT-IMP: NORMAL

## 2021-08-22 PROCEDURE — 74011636637 HC RX REV CODE- 636/637: Performed by: INTERNAL MEDICINE

## 2021-08-22 PROCEDURE — 74011250636 HC RX REV CODE- 250/636: Performed by: HOSPITALIST

## 2021-08-22 PROCEDURE — 82962 GLUCOSE BLOOD TEST: CPT

## 2021-08-22 PROCEDURE — 86140 C-REACTIVE PROTEIN: CPT

## 2021-08-22 PROCEDURE — 85379 FIBRIN DEGRADATION QUANT: CPT

## 2021-08-22 PROCEDURE — 36415 COLL VENOUS BLD VENIPUNCTURE: CPT

## 2021-08-22 PROCEDURE — 83880 ASSAY OF NATRIURETIC PEPTIDE: CPT

## 2021-08-22 PROCEDURE — 74011250637 HC RX REV CODE- 250/637: Performed by: HOSPITALIST

## 2021-08-22 RX ORDER — ZINC SULFATE 50(220)MG
1 CAPSULE ORAL DAILY
Qty: 7 CAPSULE | Refills: 0 | Status: SHIPPED | OUTPATIENT
Start: 2021-08-23 | End: 2021-08-30

## 2021-08-22 RX ORDER — LOSARTAN POTASSIUM 50 MG/1
50 TABLET ORAL DAILY
Qty: 30 TABLET | Refills: 0 | Status: SHIPPED | OUTPATIENT
Start: 2021-08-23 | End: 2021-09-22

## 2021-08-22 RX ORDER — ASCORBIC ACID 500 MG
500 TABLET ORAL 2 TIMES DAILY
Qty: 14 TABLET | Refills: 0 | Status: SHIPPED | OUTPATIENT
Start: 2021-08-22 | End: 2021-08-29

## 2021-08-22 RX ORDER — DEXAMETHASONE 6 MG/1
6 TABLET ORAL
Qty: 2 TABLET | Refills: 0 | Status: SHIPPED | OUTPATIENT
Start: 2021-08-22 | End: 2021-08-24

## 2021-08-22 RX ORDER — ATORVASTATIN CALCIUM 20 MG/1
20 TABLET, FILM COATED ORAL DAILY
Qty: 30 TABLET | Refills: 0 | Status: SHIPPED | OUTPATIENT
Start: 2021-08-23 | End: 2021-09-22

## 2021-08-22 RX ORDER — GUAIFENESIN 100 MG/5ML
81 LIQUID (ML) ORAL DAILY
Qty: 30 TABLET | Refills: 0 | Status: SHIPPED | OUTPATIENT
Start: 2021-08-23 | End: 2021-09-22

## 2021-08-22 RX ORDER — MELATONIN
2000 DAILY
Qty: 60 TABLET | Refills: 0 | Status: SHIPPED | OUTPATIENT
Start: 2021-08-23 | End: 2021-09-22

## 2021-08-22 RX ADMIN — DEXAMETHASONE SODIUM PHOSPHATE 6 MG: 10 INJECTION INTRAMUSCULAR; INTRAVENOUS at 11:00

## 2021-08-22 RX ADMIN — ENOXAPARIN SODIUM 30 MG: 40 INJECTION SUBCUTANEOUS at 06:21

## 2021-08-22 RX ADMIN — Medication 10 ML: at 06:20

## 2021-08-22 RX ADMIN — Medication 2000 UNITS: at 08:07

## 2021-08-22 RX ADMIN — INSULIN LISPRO 3 UNITS: 100 INJECTION, SOLUTION INTRAVENOUS; SUBCUTANEOUS at 06:55

## 2021-08-22 RX ADMIN — INSULIN GLARGINE 50 UNITS: 100 INJECTION, SOLUTION SUBCUTANEOUS at 08:07

## 2021-08-22 RX ADMIN — INSULIN LISPRO 12 UNITS: 100 INJECTION, SOLUTION INTRAVENOUS; SUBCUTANEOUS at 10:46

## 2021-08-22 RX ADMIN — INSULIN LISPRO 12 UNITS: 100 INJECTION, SOLUTION INTRAVENOUS; SUBCUTANEOUS at 15:50

## 2021-08-22 RX ADMIN — ATORVASTATIN CALCIUM 20 MG: 20 TABLET, FILM COATED ORAL at 08:07

## 2021-08-22 RX ADMIN — ZINC SULFATE 220 MG (50 MG) CAPSULE 1 CAPSULE: CAPSULE at 08:08

## 2021-08-22 RX ADMIN — OXYCODONE HYDROCHLORIDE AND ACETAMINOPHEN 500 MG: 500 TABLET ORAL at 08:07

## 2021-08-22 RX ADMIN — ASPIRIN 81 MG: 81 TABLET, CHEWABLE ORAL at 08:07

## 2021-08-22 RX ADMIN — INSULIN LISPRO 12 UNITS: 100 INJECTION, SOLUTION INTRAVENOUS; SUBCUTANEOUS at 06:55

## 2021-08-22 RX ADMIN — INSULIN LISPRO 4 UNITS: 100 INJECTION, SOLUTION INTRAVENOUS; SUBCUTANEOUS at 15:50

## 2021-08-22 RX ADMIN — LOSARTAN POTASSIUM 50 MG: 50 TABLET, FILM COATED ORAL at 08:07

## 2021-08-22 RX ADMIN — OXYCODONE HYDROCHLORIDE AND ACETAMINOPHEN 500 MG: 500 TABLET ORAL at 15:49

## 2021-08-22 NOTE — PROGRESS NOTES
1930: Bedside and Verbal shift change report given to Billie Taylor (oncoming nurse) by Yair Glass (offgoing nurse). Report included the following information SBAR, Kardex, ED Summary, MAR, Recent Results, and Cardiac Rhythm NSR.

## 2021-08-22 NOTE — PROGRESS NOTES
Bedside shift change report given to [address] (oncoming nurse) by Bernie Geiger (offgoing nurse). Report included the following information SBAR, Kardex, MAR and Cardiac Rhythm NSR. HPI:    Patient ID: Vince Quiros is a 79year old male. HPI  Vince Quiros is a 79year old male who presents for a complete physical exam.   HPI:   Pt complains of nothing. Tolerating meds w/o side effects.  Denies cp or sob    PAST MEDICAL, SOCI 25 MG Oral Tab Take 0.5 tablets (12.5 mg total) by mouth 2 (two) times daily. Disp: 180 tablet Rfl: 1   atorvastatin 40 MG Oral Tab Take 1 tablet (40 mg total) by mouth once daily. Disp: 90 tablet Rfl: 1   aspirin 81 MG Oral Tab Take 81 mg by mouth daily. auscultation  CARDIO: RRR without murmur  GI: good BS's,no masses, HSM or tenderness  : deferred  MUSCULOSKELETAL: back is not tender,FROM of the back  EXTREMITIES: no cyanosis, clubbing or edema  NEURO: Oriented times three,motor and sensory are grossly hyperlipidemia    Orders Placed This Encounter      CBC W/DIFF      COMP METABOLIC PANEL      HGB Y1E      LIPID PANEL      TSH W REFLEX TO FREE T4      URINALYSIS, ROUTINE      PSA (Screening) [E]      Occult Blood, Fecal, Immunoassay [E]      Meds This V

## 2021-08-22 NOTE — DISCHARGE SUMMARY
Discharge Summary       PATIENT ID: Ирина Duke  MRN: [id number]   YOB: 1950    DATE OF ADMISSION: 8/14/2021  5:39 AM    DATE OF DISCHARGE: 8/22/2021   PRIMARY CARE PROVIDER: Maikel Griffith MD     ATTENDING PHYSICIAN: Chandrakant Perez MD  DISCHARGING PROVIDER: Chandrakant Perez MD    To contact this individual call [phone] and ask the  to page. If unavailable ask to be transferred the Adult Hospitalist Department. CONSULTATIONS: IP CONSULT TO HOSPITALIST  IP CONSULT TO PULMONOLOGY    PROCEDURES/SURGERIES: * No surgery found *    ADMITTING [address] [place] [address] COURSE:   Evaluated and treated for COVID PNA, made good recovery- weaned off O2. Risk of Re-Admission: low  DISCHARGE DIAGNOSES / PLAN:      COVID 19 PNA  Acute hypoxic respiratory failure- resolved. - O2 to maintain saturations over 90%- dexamethasone, VitC, Zinc, VitD- finish course op  - Encouraged prone positioning, incentive spirometry - Procalcitonin is 0.26  - Monitor inflammatory markers - AC prophylaxis per protocol - s/p Actemra 8/15  - pulmonary following- pt clinically improved well, weaned off O2. Doing well. Dc home.     DM2 - A1c 10.7%- home regimen. HTN - Continue losartan and monitor BP   HLD - statin      FOLLOW UP APPOINTMENTS:    Follow-up Information     Follow up With Specialties Details Why Contact Info    Maikel Griffith MD Internal Medicine In 1 week  [address]  [phone]           ADDITIONAL CARE RECOMMENDATIONS:  Follow up with PCP    DIET: Resume previous diet    ACTIVITY: Activity as tolerated    DISCHARGE MEDICATIONS:  Current Discharge Medication List      START taking these medications    Details   ascorbic acid, vitamin C, (VITAMIN C) 500 mg tablet Take 1 Tablet by mouth two (2) times a day for 7 days.   Qty: 14 Tablet, Refills: 0  Start date: 8/22/2021, End date: 8/29/2021      aspirin 81 mg chewable tablet Take 1 Tablet by mouth daily for 30 days. Qty: 30 Tablet, Refills: 0  Start date: 8/23/2021, End date: 9/22/2021      atorvastatin (LIPITOR) 20 mg tablet Take 1 Tablet by mouth daily for 30 days. Qty: 30 Tablet, Refills: 0  Start date: 8/23/2021, End date: 9/22/2021      cholecalciferol (VITAMIN D3) (1000 Units /25 mcg) tablet Take 2 Tablets by mouth daily for 30 days. Qty: 60 Tablet, Refills: 0  Start date: 8/23/2021, End date: 9/22/2021      dexAMETHasone (DECADRON) 6 mg tablet Take 1 Tablet by mouth Daily (before breakfast) for 2 days. Qty: 2 Tablet, Refills: 0  Start date: 8/22/2021, End date: 8/24/2021      zinc sulfate (ZINCATE) 50 mg zinc (220 mg) capsule Take 1 Capsule by mouth daily for 7 days. Qty: 7 Capsule, Refills: 0  Start date: 8/23/2021, End date: 8/30/2021      losartan (COZAAR) 50 mg tablet Take 1 Tablet by mouth daily for 30 days. Qty: 30 Tablet, Refills: 0  Start date: 8/23/2021, End date: 9/22/2021           NOTIFY YOUR PHYSICIAN FOR ANY OF THE FOLLOWING:   Fever over 101 degrees for 24 hours. Chest pain, shortness of breath, fever, chills, nausea, vomiting, diarrhea, change in mentation, falling, weakness, bleeding. Severe pain or pain not relieved by medications. Or, any other signs or symptoms that you may have questions about. DISPOSITION:    Home With:   OT  PT  HH  RN       Long term SNF/Inpatient Rehab   x Independent/assisted living    Hospice    Other:     PATIENT CONDITION AT DISCHARGE:     Functional status    Poor     Deconditioned     Independent      Cognition     Lucid     Forgetful     Dementia      Catheters/lines (plus indication)    [place]     PICC     PEG     None      Code status     Full code     DNR      PHYSICAL EXAMINATION AT DISCHARGE:  Patient seen and examined at bedside, Condition stable, explained discharge and follow up plans.   BP (!) 110/54 (BP 1 Location: Left arm, BP Patient Position: At rest)   Pulse 82   Temp 98 °F (36.7 °C)   Resp 20   Ht 6' (1.829 m)   Wt 93 kg (205 lb 1.6 oz)   SpO2 94%   BMI 27.82 kg/m²   General:  Alert, oriented, No acute distress. Comfortable, pleasant WM  Resp:  No accessory muscle use, Good AE. Neuro:  Grossly normal, no focal neuro deficits, follows commands     CHRONIC MEDICAL DIAGNOSES:  Problem List as of 8/22/2021 Never Reviewed        Codes Class Noted - Resolved    Pneumonia due to COVID-19 virus ICD-10-CM: U07.1, J12.82  ICD-9-CM: 480.8, 079.89  8/14/2021 - Present              38 minutes were spent with the patient on counseling and coordination of care.     Signed:   Maria E Priest MD  8/22/2021  9:11 AM

## 2021-08-22 NOTE — DISCHARGE INSTRUCTIONS
Discharge Instructions       PATIENT ID: Claudene Nine  MRN: [id number]   YOB: 1950    DATE OF ADMISSION: 8/14/2021  5:39 AM    DATE OF DISCHARGE: 8/22/2021    PRIMARY CARE PROVIDER: Georgia Bull MD     ATTENDING PHYSICIAN: Jerry Greene MD  DISCHARGING PROVIDER: Dennis Azul MD    To contact this individual call [phone] and ask the  to page. If unavailable ask to be transferred the Adult Hospitalist Department. DISCHARGE DIAGNOSES COVID 19 virus Pneumonia    CONSULTATIONS: IP CONSULT TO HOSPITALIST  IP CONSULT TO PULMONOLOGY    PROCEDURES/SURGERIES: * No surgery found *    FOLLOW UP APPOINTMENTS:   Follow-up Information     Follow up With Specialties Details Why Contact Info    Georgia Bull MD Internal Medicine In 1 week  [address]  [phone]             ADDITIONAL CARE RECOMMENDATIONS: follow up with PCP     DIET: Resume previous diet    DISCHARGE MEDICATIONS:  Finish course of vitamins and steroids. · It is important that you take the medication exactly as they are prescribed. · Keep your medication in the bottles provided by the pharmacist and keep a list of the medication names, dosages, and times to be taken in your wallet. · Do not take other medications without consulting your doctor. NOTIFY YOUR PHYSICIAN FOR ANY OF THE FOLLOWING:   Fever over 101 degrees for 24 hours. Chest pain, shortness of breath, fever, chills, nausea, vomiting, diarrhea, change in mentation, falling, weakness, bleeding. Severe pain or pain not relieved by medications. Or, any other signs or symptoms that you may have questions about. It was our pleasure to help take care of you and we hope you get well very soon.     DISPOSITION:    Home With:   OT  PT  HH  RN       SNF/Inpatient Rehab/LTAC   x Independent/assisted living    Hospice    Other:     CDMP Checked:   Yes x     PROBLEM LIST Updated:  Yes x     Signed: Luisa Heredia MD  8/22/2021  9:11 AM

## 2021-08-23 ENCOUNTER — PATIENT OUTREACH (OUTPATIENT)
Dept: CASE MANAGEMENT | Age: 71
End: 2021-08-23

## 2021-08-23 RX ORDER — METFORMIN HYDROCHLORIDE 500 MG/1
1000 TABLET ORAL
COMMUNITY

## 2021-08-23 RX ORDER — GLIPIZIDE 5 MG/1
TABLET, FILM COATED, EXTENDED RELEASE ORAL DAILY
COMMUNITY

## 2021-08-23 RX ORDER — POTASSIUM CHLORIDE 750 MG/1
30 TABLET, FILM COATED, EXTENDED RELEASE ORAL DAILY
COMMUNITY

## 2021-08-23 NOTE — PROGRESS NOTES
Patient contacted regarding COVID-19 diagnosis. Discussed COVID-19 related testing which was available at this time. Test results were positive. Patient informed of results, if available? yes. Care Transition Nurse contacted the patient by telephone to perform post discharge assessment. Call within 2 business days of discharge: Yes Verified name and  with patient as identifiers. Provided introduction to self, and explanation of the CTN/ACM role, and reason for call due to risk factors for infection and/or exposure to COVID-19. Symptoms reviewed with patient who verbalized the following symptoms: no new symptoms      Due to no new or worsening symptoms encounter was not routed to provider for escalation. Discussed follow-up appointments. If no appointment was previously scheduled, appointment scheduling offered:  no. [address] follow up appointment(s): No future appointments. Non-[facility] follow up appointment(s): Patient calling Dr Charlann Carrel for an appt today. Interventions to address risk factors: Scheduled appointment with PCP-Dr Stuart Pelaez Planning:   Does patient have an Advance Directive: decision makers updated. Patient does have a DNR on chart    Educated patient about risk for severe COVID-19 due to risk factors according to CDC guidelines. CTN reviewed discharge instructions, medical action plan and red flag symptoms with the patient who verbalized understanding. Discussed COVID vaccination status: yes. Education provided on COVID-19 vaccination as appropriate. Discussed exposure protocols and quarantine with CDC Guidelines. Patient was given an opportunity to verbalize any questions and concerns and agrees to contact CTN or health care provider for questions related to their healthcare. Reviewed and educated patient on any new and changed medications related to discharge diagnosis     Was patient discharged with a pulse oximeter?  no Discussed and confirmed pulse oximeter discharge instructions and when to notify provider or seek emergency care. CTN provided contact information. Plan for follow-up call in 5-7 days based on severity of symptoms and risk factors. -spoke with patient and wife, has not had a BM in 7 days passing gas. Encouraged to drink prune juice and if no BM today or tomorrow take MOM. Patient with A1C of 10.7 on 8/14/21.

## 2021-09-02 ENCOUNTER — PATIENT OUTREACH (OUTPATIENT)
Dept: CASE MANAGEMENT | Age: 71
End: 2021-09-02

## 2021-09-02 NOTE — PROGRESS NOTES
09/02/21   Patient states he had a BM and is eating and drinking well. Had some blood work drawn for Dr Katrin Johnson in [facility] and has an appt virtually with PCP on 9/8/21. Patient is up and about and in no distress.     Yuridia Howell MSN, RN, CCM / Care Transition Nurse / [phone]

## 2021-09-23 ENCOUNTER — PATIENT OUTREACH (OUTPATIENT)
Dept: CASE MANAGEMENT | Age: 71
End: 2021-09-23

## 2021-09-23 NOTE — PROGRESS NOTES
Patient resolved from Transition of Care episode on 9/23/21. CTN was unsuccessful at contacting this patient today. Patient/family was provided the following resources and education related to COVID-19 during the initial call:                         Signs, symptoms and red flags related to COVID-19            CDC exposure and quarantine guidelines            Contact for their local Department of Health                 Patient has not had any additional ED or hospital visits. No further outreach scheduled with this CTN. Episode of Care resolved. Patient has this CTN contact information if future needs arise. Patient denies SOB and cough, still tires easy.     Briseida Rivas MSN, RN, CCM / Care Transition Nurse / [phone]

## 2022-03-18 PROBLEM — J12.82 PNEUMONIA DUE TO COVID-19 VIRUS: Status: ACTIVE | Noted: 2021-08-14

## 2022-03-18 PROBLEM — U07.1 PNEUMONIA DUE TO COVID-19 VIRUS: Status: ACTIVE | Noted: 2021-08-14

## 2023-05-14 RX ORDER — POTASSIUM CHLORIDE 750 MG/1
30 TABLET, FILM COATED, EXTENDED RELEASE ORAL DAILY
COMMUNITY

## 2023-05-14 RX ORDER — GLIPIZIDE 5 MG/1
TABLET, FILM COATED, EXTENDED RELEASE ORAL DAILY
COMMUNITY

## 2023-07-28 NOTE — PROGRESS NOTES
Bedside shift change report given to Vertell Lombard RN (oncoming nurse) by Sandra Moss RN (offgoing nurse). Report included the following information SBAR, Kardex, MAR and Cardiac Rhythm NSR. You can access the FollowMyHealth Patient Portal offered by [facility] by registering at the following website: [email or web address]. By joining [facility]’s FollowMyHealth portal, you will also be able to view your health information using other applications (apps) compatible with our system.